# Patient Record
Sex: MALE | Race: BLACK OR AFRICAN AMERICAN | Employment: UNEMPLOYED | URBAN - METROPOLITAN AREA
[De-identification: names, ages, dates, MRNs, and addresses within clinical notes are randomized per-mention and may not be internally consistent; named-entity substitution may affect disease eponyms.]

---

## 2020-12-03 ENCOUNTER — HOSPITAL ENCOUNTER (INPATIENT)
Age: 35
LOS: 5 days | Discharge: HOME | DRG: 853 | End: 2020-12-08
Attending: EMERGENCY MEDICINE | Admitting: INTERNAL MEDICINE
Payer: OTHER GOVERNMENT

## 2020-12-03 ENCOUNTER — ANESTHESIA EVENT (OUTPATIENT)
Dept: SURGERY | Age: 35
DRG: 853 | End: 2020-12-03
Payer: OTHER GOVERNMENT

## 2020-12-03 ENCOUNTER — APPOINTMENT (OUTPATIENT)
Dept: CT IMAGING | Age: 35
DRG: 853 | End: 2020-12-03
Attending: PHYSICIAN ASSISTANT
Payer: OTHER GOVERNMENT

## 2020-12-03 ENCOUNTER — ANESTHESIA (OUTPATIENT)
Dept: SURGERY | Age: 35
DRG: 853 | End: 2020-12-03
Payer: OTHER GOVERNMENT

## 2020-12-03 DIAGNOSIS — K37 APPENDICITIS, UNSPECIFIED APPENDICITIS TYPE: Primary | ICD-10-CM

## 2020-12-03 PROBLEM — N17.9 AKI (ACUTE KIDNEY INJURY) (HCC): Status: ACTIVE | Noted: 2020-12-03

## 2020-12-03 PROBLEM — F19.10 SUBSTANCE ABUSE (HCC): Status: ACTIVE | Noted: 2020-12-03

## 2020-12-03 PROBLEM — E83.52 HYPERCALCEMIA: Status: ACTIVE | Noted: 2020-12-03

## 2020-12-03 PROBLEM — E87.6 HYPOKALEMIA: Status: ACTIVE | Noted: 2020-12-03

## 2020-12-03 PROBLEM — K35.80 ACUTE APPENDICITIS: Status: ACTIVE | Noted: 2020-12-03

## 2020-12-03 LAB
ALBUMIN SERPL-MCNC: 5 G/DL (ref 3.4–5)
ALBUMIN/GLOB SERPL: 1.2 {RATIO} (ref 0.8–1.7)
ALP SERPL-CCNC: 61 U/L (ref 45–117)
ALT SERPL-CCNC: 32 U/L (ref 16–61)
AMPHET UR QL SCN: NEGATIVE
ANION GAP SERPL CALC-SCNC: 11 MMOL/L (ref 3–18)
APPEARANCE UR: CLEAR
AST SERPL-CCNC: 25 U/L (ref 10–38)
BARBITURATES UR QL SCN: NEGATIVE
BASOPHILS # BLD: 0 K/UL (ref 0–0.1)
BASOPHILS NFR BLD: 0 % (ref 0–2)
BENZODIAZ UR QL: NEGATIVE
BILIRUB SERPL-MCNC: 0.8 MG/DL (ref 0.2–1)
BILIRUB UR QL: NEGATIVE
BUN SERPL-MCNC: 12 MG/DL (ref 7–18)
BUN/CREAT SERPL: 8 (ref 12–20)
CALCIUM SERPL-MCNC: 10.4 MG/DL (ref 8.5–10.1)
CANNABINOIDS UR QL SCN: POSITIVE
CHLORIDE SERPL-SCNC: 102 MMOL/L (ref 100–111)
CO2 SERPL-SCNC: 25 MMOL/L (ref 21–32)
COCAINE UR QL SCN: NEGATIVE
COLOR UR: YELLOW
COVID-19 RAPID TEST, COVR: NOT DETECTED
CREAT SERPL-MCNC: 1.46 MG/DL (ref 0.6–1.3)
DIFFERENTIAL METHOD BLD: ABNORMAL
EOSINOPHIL # BLD: 0 K/UL (ref 0–0.4)
EOSINOPHIL NFR BLD: 0 % (ref 0–5)
ERYTHROCYTE [DISTWIDTH] IN BLOOD BY AUTOMATED COUNT: 12.2 % (ref 11.6–14.5)
GLOBULIN SER CALC-MCNC: 4.2 G/DL (ref 2–4)
GLUCOSE SERPL-MCNC: 119 MG/DL (ref 74–99)
GLUCOSE UR STRIP.AUTO-MCNC: NEGATIVE MG/DL
HCT VFR BLD AUTO: 46.1 % (ref 36–48)
HDSCOM,HDSCOM: ABNORMAL
HEALTH STATUS, XMCV2T: NORMAL
HGB BLD-MCNC: 15.2 G/DL (ref 13–16)
HGB UR QL STRIP: NEGATIVE
KETONES UR QL STRIP.AUTO: 80 MG/DL
LACTATE BLD-SCNC: 2.61 MMOL/L (ref 0.4–2)
LEUKOCYTE ESTERASE UR QL STRIP.AUTO: NEGATIVE
LIPASE SERPL-CCNC: 75 U/L (ref 73–393)
LYMPHOCYTES # BLD: 1.5 K/UL (ref 0.9–3.6)
LYMPHOCYTES NFR BLD: 10 % (ref 21–52)
MCH RBC QN AUTO: 32.3 PG (ref 24–34)
MCHC RBC AUTO-ENTMCNC: 33 G/DL (ref 31–37)
MCV RBC AUTO: 97.9 FL (ref 74–97)
METHADONE UR QL: NEGATIVE
MONOCYTES # BLD: 0.7 K/UL (ref 0.05–1.2)
MONOCYTES NFR BLD: 5 % (ref 3–10)
NEUTS SEG # BLD: 12.5 K/UL (ref 1.8–8)
NEUTS SEG NFR BLD: 85 % (ref 40–73)
NITRITE UR QL STRIP.AUTO: NEGATIVE
OPIATES UR QL: POSITIVE
PCP UR QL: NEGATIVE
PH UR STRIP: >8.5 [PH] (ref 5–8)
PLATELET # BLD AUTO: 224 K/UL (ref 135–420)
PMV BLD AUTO: 11.8 FL (ref 9.2–11.8)
POTASSIUM SERPL-SCNC: 3.4 MMOL/L (ref 3.5–5.5)
PROT SERPL-MCNC: 9.2 G/DL (ref 6.4–8.2)
PROT UR STRIP-MCNC: NEGATIVE MG/DL
RBC # BLD AUTO: 4.71 M/UL (ref 4.7–5.5)
SODIUM SERPL-SCNC: 138 MMOL/L (ref 136–145)
SOURCE, COVRS: NORMAL
SP GR UR REFRACTOMETRY: >1.03 (ref 1–1.03)
SPECIMEN TYPE, XMCV1T: NORMAL
UROBILINOGEN UR QL STRIP.AUTO: 1 EU/DL (ref 0.2–1)
WBC # BLD AUTO: 14.8 K/UL (ref 4.6–13.2)

## 2020-12-03 PROCEDURE — 74177 CT ABD & PELVIS W/CONTRAST: CPT

## 2020-12-03 PROCEDURE — 74011250637 HC RX REV CODE- 250/637: Performed by: INTERNAL MEDICINE

## 2020-12-03 PROCEDURE — 74011000250 HC RX REV CODE- 250: Performed by: NURSE ANESTHETIST, CERTIFIED REGISTERED

## 2020-12-03 PROCEDURE — 00840 ANES IPER PX LOWER ABD NOS: CPT | Performed by: ANESTHESIOLOGY

## 2020-12-03 PROCEDURE — 74011000258 HC RX REV CODE- 258: Performed by: PHYSICIAN ASSISTANT

## 2020-12-03 PROCEDURE — 77030008518 HC TBNG INSUF ENDO STRY -B: Performed by: SURGERY

## 2020-12-03 PROCEDURE — 76010000149 HC OR TIME 1 TO 1.5 HR: Performed by: SURGERY

## 2020-12-03 PROCEDURE — 87635 SARS-COV-2 COVID-19 AMP PRB: CPT

## 2020-12-03 PROCEDURE — 00840 ANES IPER PX LOWER ABD NOS: CPT | Performed by: NURSE ANESTHETIST, CERTIFIED REGISTERED

## 2020-12-03 PROCEDURE — 77030003578 HC NDL INSUF VERES AMR -B: Performed by: SURGERY

## 2020-12-03 PROCEDURE — 74011250636 HC RX REV CODE- 250/636: Performed by: NURSE ANESTHETIST, CERTIFIED REGISTERED

## 2020-12-03 PROCEDURE — 74011250636 HC RX REV CODE- 250/636: Performed by: PHYSICIAN ASSISTANT

## 2020-12-03 PROCEDURE — 80053 COMPREHEN METABOLIC PANEL: CPT

## 2020-12-03 PROCEDURE — 74011250636 HC RX REV CODE- 250/636: Performed by: SURGERY

## 2020-12-03 PROCEDURE — 88304 TISSUE EXAM BY PATHOLOGIST: CPT

## 2020-12-03 PROCEDURE — 74011000258 HC RX REV CODE- 258: Performed by: SURGERY

## 2020-12-03 PROCEDURE — 2709999900 HC NON-CHARGEABLE SUPPLY: Performed by: SURGERY

## 2020-12-03 PROCEDURE — 99222 1ST HOSP IP/OBS MODERATE 55: CPT | Performed by: SURGERY

## 2020-12-03 PROCEDURE — 74011250636 HC RX REV CODE- 250/636: Performed by: INTERNAL MEDICINE

## 2020-12-03 PROCEDURE — 74011000250 HC RX REV CODE- 250: Performed by: INTERNAL MEDICINE

## 2020-12-03 PROCEDURE — 77030008477 HC STYL SATN SLP COVD -A: Performed by: ANESTHESIOLOGY

## 2020-12-03 PROCEDURE — 77030002933 HC SUT MCRYL J&J -A: Performed by: SURGERY

## 2020-12-03 PROCEDURE — 77030010031 HC SCIS ENDOSC MPLR J&J -C: Performed by: SURGERY

## 2020-12-03 PROCEDURE — 74011000272 HC RX REV CODE- 272: Performed by: SURGERY

## 2020-12-03 PROCEDURE — 83605 ASSAY OF LACTIC ACID: CPT

## 2020-12-03 PROCEDURE — 96375 TX/PRO/DX INJ NEW DRUG ADDON: CPT

## 2020-12-03 PROCEDURE — 83690 ASSAY OF LIPASE: CPT

## 2020-12-03 PROCEDURE — 99284 EMERGENCY DEPT VISIT MOD MDM: CPT

## 2020-12-03 PROCEDURE — 85025 COMPLETE CBC W/AUTO DIFF WBC: CPT

## 2020-12-03 PROCEDURE — 77030008606 HC TRCR ENDOSC KII AMR -B: Performed by: SURGERY

## 2020-12-03 PROCEDURE — 65270000029 HC RM PRIVATE

## 2020-12-03 PROCEDURE — 87040 BLOOD CULTURE FOR BACTERIA: CPT

## 2020-12-03 PROCEDURE — 87186 SC STD MICRODIL/AGAR DIL: CPT

## 2020-12-03 PROCEDURE — 0DTJ4ZZ RESECTION OF APPENDIX, PERCUTANEOUS ENDOSCOPIC APPROACH: ICD-10-PCS | Performed by: SURGERY

## 2020-12-03 PROCEDURE — 77030018706 HC CORD MPLR COVD -A: Performed by: SURGERY

## 2020-12-03 PROCEDURE — 76060000033 HC ANESTHESIA 1 TO 1.5 HR: Performed by: SURGERY

## 2020-12-03 PROCEDURE — 76210000016 HC OR PH I REC 1 TO 1.5 HR: Performed by: SURGERY

## 2020-12-03 PROCEDURE — 81003 URINALYSIS AUTO W/O SCOPE: CPT

## 2020-12-03 PROCEDURE — 74011000636 HC RX REV CODE- 636: Performed by: EMERGENCY MEDICINE

## 2020-12-03 PROCEDURE — 77030008683 HC TU ET CUF COVD -A: Performed by: ANESTHESIOLOGY

## 2020-12-03 PROCEDURE — 77030008771 HC TU NG SALEM SUMP -A: Performed by: ANESTHESIOLOGY

## 2020-12-03 PROCEDURE — 77030010507 HC ADH SKN DERMBND J&J -B: Performed by: SURGERY

## 2020-12-03 PROCEDURE — 96365 THER/PROPH/DIAG IV INF INIT: CPT

## 2020-12-03 PROCEDURE — 87077 CULTURE AEROBIC IDENTIFY: CPT

## 2020-12-03 PROCEDURE — 77030040361 HC SLV COMPR DVT MDII -B: Performed by: SURGERY

## 2020-12-03 PROCEDURE — 77030013079 HC BLNKT BAIR HGGR 3M -A: Performed by: ANESTHESIOLOGY

## 2020-12-03 PROCEDURE — 77030022473 HC HNDL ENDO GIA UNIV USDA -C: Performed by: SURGERY

## 2020-12-03 PROCEDURE — 80307 DRUG TEST PRSMV CHEM ANLYZR: CPT

## 2020-12-03 PROCEDURE — 77030037366 HC STPLR ENDO TRI-STPLR COVD -C: Performed by: SURGERY

## 2020-12-03 RX ORDER — ENOXAPARIN SODIUM 100 MG/ML
40 INJECTION SUBCUTANEOUS DAILY
Status: DISCONTINUED | OUTPATIENT
Start: 2020-12-04 | End: 2020-12-04

## 2020-12-03 RX ORDER — MIDAZOLAM HYDROCHLORIDE 1 MG/ML
INJECTION, SOLUTION INTRAMUSCULAR; INTRAVENOUS AS NEEDED
Status: DISCONTINUED | OUTPATIENT
Start: 2020-12-03 | End: 2020-12-03 | Stop reason: HOSPADM

## 2020-12-03 RX ORDER — SODIUM CHLORIDE 0.9 % (FLUSH) 0.9 %
5-40 SYRINGE (ML) INJECTION AS NEEDED
Status: DISCONTINUED | OUTPATIENT
Start: 2020-12-03 | End: 2020-12-08 | Stop reason: HOSPADM

## 2020-12-03 RX ORDER — ONDANSETRON 2 MG/ML
4 INJECTION INTRAMUSCULAR; INTRAVENOUS
Status: DISCONTINUED | OUTPATIENT
Start: 2020-12-03 | End: 2020-12-04 | Stop reason: HOSPADM

## 2020-12-03 RX ORDER — PROPOFOL 10 MG/ML
INJECTION, EMULSION INTRAVENOUS AS NEEDED
Status: DISCONTINUED | OUTPATIENT
Start: 2020-12-03 | End: 2020-12-03 | Stop reason: HOSPADM

## 2020-12-03 RX ORDER — PROMETHAZINE HYDROCHLORIDE 25 MG/1
12.5 TABLET ORAL
Status: DISCONTINUED | OUTPATIENT
Start: 2020-12-03 | End: 2020-12-08 | Stop reason: HOSPADM

## 2020-12-03 RX ORDER — SODIUM CHLORIDE 0.9 % (FLUSH) 0.9 %
5-40 SYRINGE (ML) INJECTION EVERY 8 HOURS
Status: DISCONTINUED | OUTPATIENT
Start: 2020-12-03 | End: 2020-12-08 | Stop reason: HOSPADM

## 2020-12-03 RX ORDER — ONDANSETRON 2 MG/ML
4 INJECTION INTRAMUSCULAR; INTRAVENOUS
Status: COMPLETED | OUTPATIENT
Start: 2020-12-03 | End: 2020-12-03

## 2020-12-03 RX ORDER — MORPHINE SULFATE 2 MG/ML
2 INJECTION, SOLUTION INTRAMUSCULAR; INTRAVENOUS
Status: COMPLETED | OUTPATIENT
Start: 2020-12-03 | End: 2020-12-03

## 2020-12-03 RX ORDER — LIDOCAINE HYDROCHLORIDE 20 MG/ML
INJECTION, SOLUTION EPIDURAL; INFILTRATION; INTRACAUDAL; PERINEURAL AS NEEDED
Status: DISCONTINUED | OUTPATIENT
Start: 2020-12-03 | End: 2020-12-03 | Stop reason: HOSPADM

## 2020-12-03 RX ORDER — NEOSTIGMINE METHYLSULFATE 1 MG/ML
INJECTION, SOLUTION INTRAVENOUS AS NEEDED
Status: DISCONTINUED | OUTPATIENT
Start: 2020-12-03 | End: 2020-12-03 | Stop reason: HOSPADM

## 2020-12-03 RX ORDER — ONDANSETRON 2 MG/ML
INJECTION INTRAMUSCULAR; INTRAVENOUS AS NEEDED
Status: DISCONTINUED | OUTPATIENT
Start: 2020-12-03 | End: 2020-12-03 | Stop reason: HOSPADM

## 2020-12-03 RX ORDER — POTASSIUM CHLORIDE 20 MEQ/1
40 TABLET, EXTENDED RELEASE ORAL
Status: COMPLETED | OUTPATIENT
Start: 2020-12-03 | End: 2020-12-03

## 2020-12-03 RX ORDER — SUCCINYLCHOLINE CHLORIDE 100 MG/5ML
SYRINGE (ML) INTRAVENOUS AS NEEDED
Status: DISCONTINUED | OUTPATIENT
Start: 2020-12-03 | End: 2020-12-03 | Stop reason: HOSPADM

## 2020-12-03 RX ORDER — ACETAMINOPHEN 650 MG/1
650 SUPPOSITORY RECTAL
Status: DISCONTINUED | OUTPATIENT
Start: 2020-12-03 | End: 2020-12-08 | Stop reason: HOSPADM

## 2020-12-03 RX ORDER — FENTANYL CITRATE 50 UG/ML
INJECTION, SOLUTION INTRAMUSCULAR; INTRAVENOUS AS NEEDED
Status: DISCONTINUED | OUTPATIENT
Start: 2020-12-03 | End: 2020-12-03 | Stop reason: HOSPADM

## 2020-12-03 RX ORDER — ONDANSETRON 2 MG/ML
4 INJECTION INTRAMUSCULAR; INTRAVENOUS
Status: DISCONTINUED | OUTPATIENT
Start: 2020-12-03 | End: 2020-12-08 | Stop reason: HOSPADM

## 2020-12-03 RX ORDER — SODIUM CHLORIDE, SODIUM LACTATE, POTASSIUM CHLORIDE, CALCIUM CHLORIDE 600; 310; 30; 20 MG/100ML; MG/100ML; MG/100ML; MG/100ML
75 INJECTION, SOLUTION INTRAVENOUS CONTINUOUS
Status: DISCONTINUED | OUTPATIENT
Start: 2020-12-03 | End: 2020-12-04

## 2020-12-03 RX ORDER — GLYCOPYRROLATE 0.2 MG/ML
INJECTION INTRAMUSCULAR; INTRAVENOUS AS NEEDED
Status: DISCONTINUED | OUTPATIENT
Start: 2020-12-03 | End: 2020-12-03 | Stop reason: HOSPADM

## 2020-12-03 RX ORDER — MORPHINE SULFATE 2 MG/ML
2 INJECTION, SOLUTION INTRAMUSCULAR; INTRAVENOUS
Status: DISCONTINUED | OUTPATIENT
Start: 2020-12-03 | End: 2020-12-03

## 2020-12-03 RX ORDER — SODIUM CHLORIDE, SODIUM LACTATE, POTASSIUM CHLORIDE, CALCIUM CHLORIDE 600; 310; 30; 20 MG/100ML; MG/100ML; MG/100ML; MG/100ML
75 INJECTION, SOLUTION INTRAVENOUS CONTINUOUS
Status: DISCONTINUED | OUTPATIENT
Start: 2020-12-03 | End: 2020-12-05

## 2020-12-03 RX ORDER — POLYETHYLENE GLYCOL 3350 17 G/17G
17 POWDER, FOR SOLUTION ORAL DAILY PRN
Status: DISCONTINUED | OUTPATIENT
Start: 2020-12-03 | End: 2020-12-08 | Stop reason: HOSPADM

## 2020-12-03 RX ORDER — HYDROMORPHONE HYDROCHLORIDE 1 MG/ML
INJECTION, SOLUTION INTRAMUSCULAR; INTRAVENOUS; SUBCUTANEOUS AS NEEDED
Status: DISCONTINUED | OUTPATIENT
Start: 2020-12-03 | End: 2020-12-03 | Stop reason: HOSPADM

## 2020-12-03 RX ORDER — ACETAMINOPHEN 325 MG/1
650 TABLET ORAL
Status: DISCONTINUED | OUTPATIENT
Start: 2020-12-03 | End: 2020-12-08 | Stop reason: HOSPADM

## 2020-12-03 RX ORDER — FENTANYL CITRATE 50 UG/ML
50 INJECTION, SOLUTION INTRAMUSCULAR; INTRAVENOUS
Status: COMPLETED | OUTPATIENT
Start: 2020-12-03 | End: 2020-12-03

## 2020-12-03 RX ORDER — VECURONIUM BROMIDE FOR INJECTION 1 MG/ML
INJECTION, POWDER, LYOPHILIZED, FOR SOLUTION INTRAVENOUS AS NEEDED
Status: DISCONTINUED | OUTPATIENT
Start: 2020-12-03 | End: 2020-12-03 | Stop reason: HOSPADM

## 2020-12-03 RX ADMIN — GLYCOPYRROLATE 0.4 MG: 0.2 INJECTION INTRAMUSCULAR; INTRAVENOUS at 20:50

## 2020-12-03 RX ADMIN — ONDANSETRON 4 MG: 2 INJECTION INTRAMUSCULAR; INTRAVENOUS at 16:36

## 2020-12-03 RX ADMIN — FAMOTIDINE 20 MG: 10 INJECTION INTRAVENOUS at 23:04

## 2020-12-03 RX ADMIN — Medication 80 MG: at 19:59

## 2020-12-03 RX ADMIN — SODIUM CHLORIDE, SODIUM LACTATE, POTASSIUM CHLORIDE, AND CALCIUM CHLORIDE 150 ML/HR: 600; 310; 30; 20 INJECTION, SOLUTION INTRAVENOUS at 19:01

## 2020-12-03 RX ADMIN — SODIUM CHLORIDE, SODIUM LACTATE, POTASSIUM CHLORIDE, AND CALCIUM CHLORIDE 150 ML/HR: 600; 310; 30; 20 INJECTION, SOLUTION INTRAVENOUS at 23:03

## 2020-12-03 RX ADMIN — IOPAMIDOL 90 ML: 612 INJECTION, SOLUTION INTRAVENOUS at 16:46

## 2020-12-03 RX ADMIN — FENTANYL CITRATE 50 MCG: 50 INJECTION, SOLUTION INTRAMUSCULAR; INTRAVENOUS at 19:54

## 2020-12-03 RX ADMIN — LIDOCAINE HYDROCHLORIDE 100 MG: 20 INJECTION, SOLUTION INTRAVENOUS at 19:59

## 2020-12-03 RX ADMIN — PROPOFOL 150 MG: 10 INJECTION, EMULSION INTRAVENOUS at 19:59

## 2020-12-03 RX ADMIN — Medication 3 MG: at 20:50

## 2020-12-03 RX ADMIN — POTASSIUM CHLORIDE 40 MEQ: 1500 TABLET, EXTENDED RELEASE ORAL at 22:14

## 2020-12-03 RX ADMIN — FENTANYL CITRATE 50 MCG: 50 INJECTION, SOLUTION INTRAMUSCULAR; INTRAVENOUS at 22:23

## 2020-12-03 RX ADMIN — MORPHINE SULFATE 2 MG: 2 INJECTION, SOLUTION INTRAMUSCULAR; INTRAVENOUS at 19:01

## 2020-12-03 RX ADMIN — PROPOFOL 50 MG: 10 INJECTION, EMULSION INTRAVENOUS at 20:50

## 2020-12-03 RX ADMIN — FENTANYL CITRATE 50 MCG: 50 INJECTION, SOLUTION INTRAMUSCULAR; INTRAVENOUS at 21:32

## 2020-12-03 RX ADMIN — HYDROMORPHONE HYDROCHLORIDE 1 MG: 1 INJECTION, SOLUTION INTRAMUSCULAR; INTRAVENOUS; SUBCUTANEOUS at 20:54

## 2020-12-03 RX ADMIN — PIPERACILLIN SODIUM AND TAZOBACTAM SODIUM 4.5 G: 4; .5 INJECTION, POWDER, LYOPHILIZED, FOR SOLUTION INTRAVENOUS at 17:20

## 2020-12-03 RX ADMIN — FENTANYL CITRATE 50 MCG: 50 INJECTION, SOLUTION INTRAMUSCULAR; INTRAVENOUS at 20:16

## 2020-12-03 RX ADMIN — ONDANSETRON 4 MG: 2 SOLUTION INTRAMUSCULAR; INTRAVENOUS at 20:48

## 2020-12-03 RX ADMIN — MORPHINE SULFATE 2 MG: 2 INJECTION, SOLUTION INTRAMUSCULAR; INTRAVENOUS at 16:38

## 2020-12-03 RX ADMIN — VECURONIUM BROMIDE FOR INJECTION 4 MG: 1 INJECTION, POWDER, LYOPHILIZED, FOR SOLUTION INTRAVENOUS at 20:03

## 2020-12-03 RX ADMIN — MIDAZOLAM 2 MG: 1 INJECTION INTRAMUSCULAR; INTRAVENOUS at 19:54

## 2020-12-03 RX ADMIN — PIPERACILLIN AND TAZOBACTAM 3.38 G: 3; .375 INJECTION, POWDER, LYOPHILIZED, FOR SOLUTION INTRAVENOUS at 23:09

## 2020-12-03 NOTE — PROGRESS NOTES
Dosing for Zosyn requested by Dr Sravanthi Brewer. Patient in ER with likely OR this evening for acute appendicitis. If continued postop change to extended dosing interval.  Sabrina Griffin. Jacquelyn MS R. Ph  [phone]

## 2020-12-03 NOTE — ED PROVIDER NOTES
70-year-old male with noted past medical history here with complaint of abdominal pain started this morning. States he just has associated nausea, vomited once prior to arrival.  Normal bowel movements. No fever chills, shortness of breath or chest pain. At this time patient states his pain is a 10 out of 10, and lower abdomen and cannot specify exact location. States it feels dull. No flank pain or urinary symptoms. No penile discharge or other  symptoms           No past medical history on file. No past surgical history on file. No family history on file.     Social History     Socioeconomic History    Marital status: SINGLE     Spouse name: Not on file    Number of children: Not on file    Years of education: Not on file    Highest education level: Not on file   Occupational History    Not on file   Social Needs    Financial resource strain: Not on file    Food insecurity     Worry: Not on file     Inability: Not on file    Transportation needs     Medical: Not on file     Non-medical: Not on file   Tobacco Use    Smoking status: Not on file   Substance and Sexual Activity    Alcohol use: Not on file    Drug use: Not on file    Sexual activity: Not on file   Lifestyle    Physical activity     Days per week: Not on file     Minutes per session: Not on file    Stress: Not on file   Relationships    Social connections     Talks on phone: Not on file     Gets together: Not on file     Attends Yarsanism service: Not on file     Active member of club or organization: Not on file     Attends meetings of clubs or organizations: Not on file     Relationship status: Not on file    Intimate partner violence     Fear of current or ex partner: Not on file     Emotionally abused: Not on file     Physically abused: Not on file     Forced sexual activity: Not on file   Other Topics Concern    Not on file   Social History Narrative    Not on file         ALLERGIES: Patient has no allergy information on record. Review of Systems   Constitutional: Negative for chills and fever. HENT: Negative for congestion. Respiratory: Negative for cough and wheezing. Cardiovascular: Negative for chest pain and leg swelling. Gastrointestinal: Positive for abdominal pain. Negative for abdominal distention, anal bleeding, blood in stool, constipation, diarrhea, nausea and vomiting. Genitourinary: Negative. Musculoskeletal: Negative. Skin: Negative. Neurological: Negative for dizziness, seizures, weakness and headaches. Hematological: Negative. Psychiatric/Behavioral: Negative. All other systems reviewed and are negative. Vitals:    12/03/20 1513   BP: 128/86   Pulse: (!) 54   Resp: 20   Temp: 97.7 °F (36.5 °C)   SpO2: 100%   Weight: 78 kg (172 lb)   Height: 6' 4\" (1.93 m)            Physical Exam  Vitals signs and nursing note reviewed. Constitutional:       General: He is not in acute distress. Appearance: He is well-developed. He is not diaphoretic. Comments: NAD, well hydrated, non toxic     HENT:      Head: Normocephalic and atraumatic. Nose: Nose normal.      Mouth/Throat:      Pharynx: No oropharyngeal exudate. Eyes:      Conjunctiva/sclera: Conjunctivae normal.      Pupils: Pupils are equal, round, and reactive to light. Neck:      Musculoskeletal: Normal range of motion and neck supple. Cardiovascular:      Rate and Rhythm: Normal rate and regular rhythm. Heart sounds: Normal heart sounds. No murmur. Pulmonary:      Effort: Pulmonary effort is normal. No respiratory distress. Breath sounds: Normal breath sounds. No wheezing or rales. Abdominal:      General: There is no distension. Palpations: Abdomen is soft. Tenderness: There is abdominal tenderness in the right lower quadrant and epigastric area. There is no right CVA tenderness, left CVA tenderness, guarding or rebound.  Negative signs include Rousseau's sign, Rovsing's sign, McBurney's sign, psoas sign and obturator sign. Musculoskeletal: Normal range of motion. Lymphadenopathy:      Cervical: No cervical adenopathy. Skin:     General: Skin is warm. Findings: No rash. Neurological:      Mental Status: He is alert and oriented to person, place, and time. Cranial Nerves: No cranial nerve deficit. Coordination: Coordination normal.   Psychiatric:         Behavior: Behavior normal.          MDM       Procedures    Medications ordered:   Medications   ondansetron (ZOFRAN) injection 4 mg (has no administration in time range)         Lab findings:  Recent Results (from the past 12 hour(s))   CBC WITH AUTOMATED DIFF    Collection Time: 12/03/20  3:28 PM   Result Value Ref Range    WBC 14.8 (H) 4.6 - 13.2 K/uL    RBC 4.71 4.70 - 5.50 M/uL    HGB 15.2 13.0 - 16.0 g/dL    HCT 46.1 36.0 - 48.0 %    MCV 97.9 (H) 74.0 - 97.0 FL    MCH 32.3 24.0 - 34.0 PG    MCHC 33.0 31.0 - 37.0 g/dL    RDW 12.2 11.6 - 14.5 %    PLATELET 921 111 - 733 K/uL    MPV 11.8 9.2 - 11.8 FL    NEUTROPHILS 85 (H) 40 - 73 %    LYMPHOCYTES 10 (L) 21 - 52 %    MONOCYTES 5 3 - 10 %    EOSINOPHILS 0 0 - 5 %    BASOPHILS 0 0 - 2 %    ABS. NEUTROPHILS 12.5 (H) 1.8 - 8.0 K/UL    ABS. LYMPHOCYTES 1.5 0.9 - 3.6 K/UL    ABS. MONOCYTES 0.7 0.05 - 1.2 K/UL    ABS. EOSINOPHILS 0.0 0.0 - 0.4 K/UL    ABS.  BASOPHILS 0.0 0.0 - 0.1 K/UL    DF AUTOMATED     METABOLIC PANEL, COMPREHENSIVE    Collection Time: 12/03/20  3:28 PM   Result Value Ref Range    Sodium 138 136 - 145 mmol/L    Potassium 3.4 (L) 3.5 - 5.5 mmol/L    Chloride 102 100 - 111 mmol/L    CO2 25 21 - 32 mmol/L    Anion gap 11 3.0 - 18 mmol/L    Glucose 119 (H) 74 - 99 mg/dL    BUN 12 7.0 - 18 MG/DL    Creatinine 1.46 (H) 0.6 - 1.3 MG/DL    BUN/Creatinine ratio 8 (L) 12 - 20      GFR est AA >60 >60 ml/min/1.73m2    GFR est non-AA 55 (L) >60 ml/min/1.73m2    Calcium 10.4 (H) 8.5 - 10.1 MG/DL    Bilirubin, total 0.8 0.2 - 1.0 MG/DL    ALT (SGPT) 32 16 - 61 U/L AST (SGOT) 25 10 - 38 U/L    Alk. phosphatase 61 45 - 117 U/L    Protein, total 9.2 (H) 6.4 - 8.2 g/dL    Albumin 5.0 3.4 - 5.0 g/dL    Globulin 4.2 (H) 2.0 - 4.0 g/dL    A-G Ratio 1.2 0.8 - 1.7     LIPASE    Collection Time: 12/03/20  3:28 PM   Result Value Ref Range    Lipase 75 73 - 393 U/L          X-Ray, CT or other radiology findings or impressions:  No results found. Progress notes, Consult notes or additional Procedure notes:     70-year-old male here with abdominal pain that started this morning associated with some vomiting. Patient is noted to have elevated white blood count, no bands. No other abnormal findings on labs. CAT scan consistent with acute appendicitis. Patient will be admitted to Dr. Maureen Gibson after discussion with Dr. Lisa Bates who will plan to take patient to surgery tonight or tomorrow morning. He agrees with Claudian and is asking for patient to have a Covid test and for him to be n.p.o. Patient is agreeable this time for admission    Dispo:   Admitted      Diagnosis: Acute appendicitis    Follow-up Information    None          Patient's Medications    No medications on file

## 2020-12-03 NOTE — ED TRIAGE NOTES
Pt arrives with c/o abdominal pain that he woke up with this am. Pt states emesis a few times and chills also. Pt denies dysuria or vomiting.

## 2020-12-03 NOTE — H&P
Internal Medicine History and Physical  Note           NAME:  Mitch Majano   :   1985   MRN:  [id number]     PCP:  None     Date/Time:  12/3/2020 6:21 PM      I hereby certify this patient for admission based upon medical necessity as noted below:        Assessment / plan :        #   Acute appendicitis (12/3/2020). Dw surgeon on call , he will take to to surgery likely tonight , keep npo. IVF  Iv zosyn. #   Substance abuse ([facility] [address] (12/3/2020). Urine drug. Counseled on quit substances. Alcohol and smoking, weeds. #  SCOT (acute kidney injury) ([facility] [address] (12/3/2020). IVF. Serial labs. Avoid nephro toxins      #  Hypercalcemia (12/3/2020). IVF. Monitor Ca level      #  Hypokalemia (12/3/2020). Replete. trend mg level       # Continue home regimen / Medications when appropriate. -DVT prophylaxis :  lovenox. - Code Status : FULL    -Other chronic medical problems as per past history. Further management depend on the course of the case and expanded data base. DISPO - pt to be admitted at this time for reasons addressed above, continued hospitalization for ongoing assessment and treatment indicated        Subjective:     CHIEF COMPLAINT: Sever abdominal pains started today      HISTORY OF PRESENT ILLNESS:     Mr. Haris Gilliam is a 28 y.o.  male who is admitted for acute appendicitis . Mr. Haris Gilliam with past medical history as noted below , presented to the Emergency Department today  complaining of above, co sever lower abdominal pains mostly on R side with tenderness. Report fever and chills earlier today and now feel cold, getting better on pain meds given in er. Sever pains started this am. CT scan abd reported picture of appendicitis. Started on iv zosyn in er. Triage and ER notes noted. ER MD wanted to admit the patient to the hospital for further management and called hospitalist to facilitate this process.  Surgery dw ER provider and requested npo now and iv Abx. Deny been on regular home medications. He admit drinks 4 drinks of Koniac a day , smoke tobacco and weeds. No exposure to COVID. Blood culture sent from ER. WBC elevated . No past medical history on file. No past surgical history on file. Social History     Tobacco Use    Smoking status: Not on file   Substance Use Topics    Alcohol use: Not on file        No family history on file.      No Known Allergies     Prior to Admission medications    Not on File       Review of Systems:  (bold if positive, otherwise negative), apart from what mentioned in HPI  Apart from above patient deny any other significant complain  Gen:  Weight gain, weight loss, fever, chills, fatigue  Eyes:  Visual changes, pain, conjunctivitis  ENT:  Sore throat, rhinorrhea, decreased hearing  CVS:  Palpitations, chest pain, dizziness, syncope, edema, PND  Pulm:  Cough, dyspnea, sputum, hemoptysis, wheezing  GI:  Abdominal pain, nausea, emesis, diarrhea, constipation, GERD, melena  :  Hematuria, incontinence, nocturia, dysuria, discharge  MS:  Pain, weakness, swelling, arthritis  Skin:  Rash, erythema, abscess, wound, moles  Endo:  Heat intolerance, cold intolerance, weight gain, polyuria  Hem:  Enlarged nodes, bruising, bleeding, night sweats  Renal:  Edema, change in urine, flank pain  Neuro:  Numbness, tingling, weakness, seizure, headache, tremors       VITALS:    Vital signs reviewed; most recent are:    Visit Vitals  /86 (BP 1 Location: Right arm, BP Patient Position: Sitting)   Pulse (!) 54   Temp 97.7 °F (36.5 °C)   Resp 20   Ht 6' 4\" (1.93 m)   Wt 78 kg (172 lb)   SpO2 100%   BMI 20.94 kg/m²     SpO2 Readings from Last 6 Encounters:   12/03/20 100%        No intake or output data in the 24 hours ending 12/03/20 4431     Physical Exam:     Gen:  Appear stated age, Well-developed,   in   acute distress  HEENT:  Head atraumatic, normocephalic , hearing intact to voice    Neck:  Trachea midline , No apparent JVD, Supple,  thyroid non-tender  Resp:  No accessory muscle use,Bilateral BS present, clear breath sounds without wheezes rales or rhonchi  Card:  normal S1, S2 without Gallop . No Significant lower leg peripheral edema. Abd: interestingly I couldn't elicit tenderness ,   Soft, non-tender, non-distended, bowel sounds are present . Musc:  No cyanosis or clubbing. Skin: Warm and dry . No rashes or ulcers, skin turgor is good. Neuro:  Cranial nerves are grossly intact, no clear area of focal motor weakness, follows commands appropriately. Good insight,  alert. Labs: All Cardiac Markers in the last 24 hours: No results found for: CPK, CK, CKMMB, CKMB, RCK3, CKMBT, CKNDX, CKND1, BRIGIDA, TROPT, TROIQ, JUSTINE, TROPT, TNIPOC, BNP, BNPP    Recent Labs     12/03/20  1528   WBC 14.8*   HGB 15.2   HCT 46.1        Recent Labs     12/03/20  1528      K 3.4*      CO2 25   *   BUN 12   CREA 1.46*   CA 10.4*   ALB 5.0   TBILI 0.8   ALT 32     No results found for: GLUCPOC  No results for input(s): PH, PCO2, PO2, HCO3, FIO2 in the last 72 hours. No results for input(s): INR, INREXT in the last 72 hours. Ct Abd Pelv W Cont    Result Date: 12/3/2020  IMPRESSION: 1.  CT findings compatible with early acute unruptured appendicitis. No evidence of radiopaque appendicoliths. No evidence of an organized or drainable periappendiceal fluid collection. Please refer to radiology reports. Risk of deterioration: high      Total time spent in the care of this patient: [address] discussed with: Patient, Nursing Staff, Consultant/Specialist, >50% of time spent in counseling and coordination of care and ER MD      Discussed:  Care Plan       I have personally reviewed all pertinent labs, films and EKGs that have officially resulted.  I reviewed available pertaining electronic documentation outlining the initial presentation as well as the emergency room physician's encounter. This document in whole or part of it has been produced using voice recognition software. Unrecognized errors in transcription may be present.     Attending Physician: Richy Parekh MD

## 2020-12-04 LAB
ALBUMIN SERPL-MCNC: 4.2 G/DL (ref 3.4–5)
ALBUMIN/GLOB SERPL: 1.1 {RATIO} (ref 0.8–1.7)
ALP SERPL-CCNC: 50 U/L (ref 45–117)
ALT SERPL-CCNC: 27 U/L (ref 16–61)
ANION GAP SERPL CALC-SCNC: 7 MMOL/L (ref 3–18)
AST SERPL-CCNC: 18 U/L (ref 10–38)
BASOPHILS # BLD: 0 K/UL (ref 0–0.1)
BASOPHILS NFR BLD: 0 % (ref 0–2)
BILIRUB DIRECT SERPL-MCNC: 0.2 MG/DL (ref 0–0.2)
BILIRUB SERPL-MCNC: 0.7 MG/DL (ref 0.2–1)
BUN SERPL-MCNC: 10 MG/DL (ref 7–18)
BUN/CREAT SERPL: 8 (ref 12–20)
CALCIUM SERPL-MCNC: 9.7 MG/DL (ref 8.5–10.1)
CHLORIDE SERPL-SCNC: 105 MMOL/L (ref 100–111)
CO2 SERPL-SCNC: 27 MMOL/L (ref 21–32)
CREAT SERPL-MCNC: 1.32 MG/DL (ref 0.6–1.3)
DIFFERENTIAL METHOD BLD: ABNORMAL
EOSINOPHIL # BLD: 0 K/UL (ref 0–0.4)
EOSINOPHIL NFR BLD: 0 % (ref 0–5)
ERYTHROCYTE [DISTWIDTH] IN BLOOD BY AUTOMATED COUNT: 12.5 % (ref 11.6–14.5)
GLOBULIN SER CALC-MCNC: 3.8 G/DL (ref 2–4)
GLUCOSE SERPL-MCNC: 112 MG/DL (ref 74–99)
HCT VFR BLD AUTO: 48.6 % (ref 36–48)
HGB BLD-MCNC: 15.8 G/DL (ref 13–16)
LACTATE SERPL-SCNC: 1.3 MMOL/L (ref 0.4–2)
LIPASE SERPL-CCNC: 167 U/L (ref 73–393)
LYMPHOCYTES # BLD: 0.4 K/UL (ref 0.9–3.6)
LYMPHOCYTES NFR BLD: 6 % (ref 21–52)
MAGNESIUM SERPL-MCNC: 1.7 MG/DL (ref 1.6–2.6)
MCH RBC QN AUTO: 32.2 PG (ref 24–34)
MCHC RBC AUTO-ENTMCNC: 32.5 G/DL (ref 31–37)
MCV RBC AUTO: 99 FL (ref 74–97)
MONOCYTES # BLD: 0.4 K/UL (ref 0.05–1.2)
MONOCYTES NFR BLD: 6 % (ref 3–10)
NEUTS SEG # BLD: 6 K/UL (ref 1.8–8)
NEUTS SEG NFR BLD: 88 % (ref 40–73)
PHOSPHATE SERPL-MCNC: 4.5 MG/DL (ref 2.5–4.9)
PLATELET # BLD AUTO: 200 K/UL (ref 135–420)
PMV BLD AUTO: 11.4 FL (ref 9.2–11.8)
POTASSIUM SERPL-SCNC: 4.4 MMOL/L (ref 3.5–5.5)
PROT SERPL-MCNC: 8 G/DL (ref 6.4–8.2)
RBC # BLD AUTO: 4.91 M/UL (ref 4.7–5.5)
SODIUM SERPL-SCNC: 139 MMOL/L (ref 136–145)
WBC # BLD AUTO: 6.8 K/UL (ref 4.6–13.2)

## 2020-12-04 PROCEDURE — 80076 HEPATIC FUNCTION PANEL: CPT

## 2020-12-04 PROCEDURE — 74011000258 HC RX REV CODE- 258: Performed by: SURGERY

## 2020-12-04 PROCEDURE — 83690 ASSAY OF LIPASE: CPT

## 2020-12-04 PROCEDURE — 94760 N-INVAS EAR/PLS OXIMETRY 1: CPT

## 2020-12-04 PROCEDURE — 44970 LAPAROSCOPY APPENDECTOMY: CPT | Performed by: SURGERY

## 2020-12-04 PROCEDURE — 36415 COLL VENOUS BLD VENIPUNCTURE: CPT

## 2020-12-04 PROCEDURE — 80048 BASIC METABOLIC PNL TOTAL CA: CPT

## 2020-12-04 PROCEDURE — 74011000258 HC RX REV CODE- 258: Performed by: HOSPITALIST

## 2020-12-04 PROCEDURE — 83735 ASSAY OF MAGNESIUM: CPT

## 2020-12-04 PROCEDURE — 74011250636 HC RX REV CODE- 250/636: Performed by: SURGERY

## 2020-12-04 PROCEDURE — 94660 CPAP INITIATION&MGMT: CPT

## 2020-12-04 PROCEDURE — 74011250636 HC RX REV CODE- 250/636: Performed by: HOSPITALIST

## 2020-12-04 PROCEDURE — 85025 COMPLETE CBC W/AUTO DIFF WBC: CPT

## 2020-12-04 PROCEDURE — 84100 ASSAY OF PHOSPHORUS: CPT

## 2020-12-04 PROCEDURE — 99024 POSTOP FOLLOW-UP VISIT: CPT | Performed by: SURGERY

## 2020-12-04 PROCEDURE — 2709999900 HC NON-CHARGEABLE SUPPLY

## 2020-12-04 PROCEDURE — 77030035694 HC MSK BIPAP FLL FAC PERFMAX PHIL -B

## 2020-12-04 PROCEDURE — 74011000250 HC RX REV CODE- 250: Performed by: INTERNAL MEDICINE

## 2020-12-04 PROCEDURE — 83605 ASSAY OF LACTIC ACID: CPT

## 2020-12-04 PROCEDURE — 65270000029 HC RM PRIVATE

## 2020-12-04 PROCEDURE — 74011250637 HC RX REV CODE- 250/637: Performed by: HOSPITALIST

## 2020-12-04 PROCEDURE — 74011250636 HC RX REV CODE- 250/636: Performed by: INTERNAL MEDICINE

## 2020-12-04 RX ORDER — OXYCODONE AND ACETAMINOPHEN 5; 325 MG/1; MG/1
1 TABLET ORAL
Status: DISCONTINUED | OUTPATIENT
Start: 2020-12-04 | End: 2020-12-08 | Stop reason: HOSPADM

## 2020-12-04 RX ADMIN — PIPERACILLIN AND TAZOBACTAM 3.38 G: 3; .375 INJECTION, POWDER, LYOPHILIZED, FOR SOLUTION INTRAVENOUS at 23:44

## 2020-12-04 RX ADMIN — OXYCODONE HYDROCHLORIDE AND ACETAMINOPHEN 1 TABLET: 5; 325 TABLET ORAL at 20:19

## 2020-12-04 RX ADMIN — PIPERACILLIN AND TAZOBACTAM 3.38 G: 3; .375 INJECTION, POWDER, LYOPHILIZED, FOR SOLUTION INTRAVENOUS at 15:25

## 2020-12-04 RX ADMIN — OXYCODONE HYDROCHLORIDE AND ACETAMINOPHEN 1 TABLET: 5; 325 TABLET ORAL at 03:35

## 2020-12-04 RX ADMIN — OXYCODONE HYDROCHLORIDE AND ACETAMINOPHEN 1 TABLET: 5; 325 TABLET ORAL at 15:23

## 2020-12-04 RX ADMIN — OXYCODONE HYDROCHLORIDE AND ACETAMINOPHEN 1 TABLET: 5; 325 TABLET ORAL at 10:24

## 2020-12-04 RX ADMIN — PIPERACILLIN AND TAZOBACTAM 3.38 G: 3; .375 INJECTION, POWDER, LYOPHILIZED, FOR SOLUTION INTRAVENOUS at 10:24

## 2020-12-04 RX ADMIN — Medication 10 ML: at 22:00

## 2020-12-04 RX ADMIN — PIPERACILLIN AND TAZOBACTAM 3.38 G: 3; .375 INJECTION, POWDER, LYOPHILIZED, FOR SOLUTION INTRAVENOUS at 04:52

## 2020-12-04 RX ADMIN — FAMOTIDINE 20 MG: 10 INJECTION INTRAVENOUS at 20:20

## 2020-12-04 RX ADMIN — FAMOTIDINE 20 MG: 10 INJECTION INTRAVENOUS at 10:18

## 2020-12-04 NOTE — PERIOP NOTES
Assumed care of pt. Report from Liliana Victoria RN. Denise Marrero. PIV without complications. No c/o pain/discomfort. TRANSFER - OUT REPORT:    Verbal report given to Nagi Duarte  (name) on Joseph Oneill  being transferred to     (unit) for routine post - op       Report consisted of patients Situation, Background, Assessment and   Recommendations(SBAR). Information from the following report(s) OR Summary, Procedure Summary, Intake/Output and MAR was reviewed with the receiving nurse. Lines:   Peripheral IV 33/20/77 Left Cephalic (Active)   Site Assessment Clean, dry, & intact 12/04/20 0615   Phlebitis Assessment 0 12/04/20 0615   Infiltration Assessment 0 12/04/20 0615   Dressing Status Clean, dry, & intact 12/04/20 0615   Dressing Type Transparent;Tape 12/04/20 0615   Hub Color/Line Status Pink; Infusing 12/04/20 0615        Opportunity for questions and clarification was provided.       Patient transported with:   [employer]

## 2020-12-04 NOTE — ANESTHESIA PREPROCEDURE EVALUATION
Relevant Problems   No relevant active problems       Anesthetic History   No history of anesthetic complications            Review of Systems / Medical History  Patient summary reviewed and pertinent labs reviewed    Pulmonary  Within defined limits                 Neuro/Psych   Within defined limits           Cardiovascular  Within defined limits                     GI/Hepatic/Renal  Within defined limits              Endo/Other  Within defined limits           Other Findings   Comments: Substance abuse           Physical Exam    Airway  Mallampati: I  TM Distance: > 6 cm  Neck ROM: normal range of motion        Cardiovascular  Regular rate and rhythm,  S1 and S2 normal,  no murmur, click, rub, or gallop             Dental  No notable dental hx       Pulmonary  Breath sounds clear to auscultation               Abdominal  GI exam deferred       Other Findings            Anesthetic Plan    ASA: 2, emergent  Anesthesia type: general          Induction: Intravenous  Anesthetic plan and risks discussed with: Patient

## 2020-12-04 NOTE — ED NOTES
Consent form signed and witnessed by myself, patient, and surgeon Dr. Elma Weir.  To be scanned into chart by

## 2020-12-04 NOTE — OP NOTES
[facility]  OPERATIVE REPORT    Name:  Joshua Molina  MR#:   [id number]  :  1985  ACCOUNT #:  [de-identified]  DATE OF SERVICE:  2020      PREOPERATIVE DIAGNOSIS:  Acute appendicitis. POSTOPERATIVE DIAGNOSIS:  Acute ruptured appendicitis. PROCEDURE PERFORMED:  Laparoscopic appendectomy. SURGEON:  Tasia Wright. MD Cyn Thompson. ANESTHESIA:  General.    COMPLICATIONS:  None. SPECIMENS REMOVED:  Appendix. IMPLANTS:  None. ESTIMATED BLOOD LOSS:  Minimal.    PROCEDURE:  The patient was brought to operating room. Anesthesia was induced. Scrubbing and draping of the abdomen was done in the usual manner. A time-out was performed. A skin incision was performed in the supraumbilical area. Veress needle was inserted. Saline drop test was performed. Abdomen was insufflated. A 5 mm port was inserted. Exploration of the abdomen revealed no injury from the Veress needle or the port and also at that point, there was no evidence of any rupture of the appendix because there was no fluid seen, but we could not see the appendix because there was omentum on the cecum. So, at this point, under direct visualization, another 5 mm port was placed in the supraumbilical area and a 12 mm port was placed in the left lower quadrant. At this point, a grasper and the [place] were used and we placed the patient in Trendelenburg position with the right side slightly up and then I was able to move the omentum gently and I was able to identify the cecum but at this point, I realized that there is a very large thick, dilated appendix that was stuck to the small bowel at the distal ileum. I was not sure if this represents a tumor or severe appendicitis with contained ruptured ceiling under small bowel.   So, at this point, I again identified the cecum and I tried to identify the base of the appendix, but I was not able to see it because of the adhesion between the middle part of the appendix where the inflammation was and distal ileum surprisingly were stuck to each other. So, at this point, I grabbed the mesoappendix and I lifted up and I tried to gently dissect the mesentery of the mesoappendix, but then I reached an area where the small bowel, the distal ileum was stuck to the appendix itself. I tried to dissect this gently, but however, the area was very hard to dissect and very thick. So, I decided to stop at this point and dissect at the base between the appendix and the cecum. I was able to find a part of the appendix proximal towards the inflammation, but that looked normal.  I was able to dissect behind it and create a space and this completely looked normal, so I was happy that I can fire a stapler there but at that point, I waited to make sure that I will be able to dissect the small bowel from the appendix and make sure there is no perforation. When I started to dissect the appendix from the small bowel, there was a gush of contrast from a perforated appendix, so at this point, I used suction and irrigation and I suctioned all the contrast.  At this point, I had to look at the small bowel area to make sure there is no fistula or erosion from the appendix into the small bowel and that this is only an inflammatory reaction. However, when I lifted the appendix, it looked very hard and bulgy like a tumor, but later on when we realized that it was ruptured, it did decrease in the thickness, so it made me think that this was probably a collection from the rupture but not a tumor. So, at this point, I fired a blue load ROBBIN at the base of the appendix and the mesoappendix was already dissected from before during the resection to split the appendix from the small bowel. So, at this point, I placed the appendix aside and I did multiple irrigation and suctioning, small amount each time to be able to aspirate as much as possible of the fluid that I used to irrigate the area. At the end, the area was very clean and at least at the base of the cecum, the stapler line was good with no evidence of any ischemia and the mesoappendix was clean and dry with no evidence of any bleeding and then specifically I looked at the distal ileum at the site where it was stuck to the appendix to see if there was any serosal tear or any perforation or any fistula and there was none. So, at this point, I placed an EndoCatch through a 12 mm port and I put the appendix in the EndoCatch and we took it out through a 12 mm incision. I tried to feel the appendix outside through the bag, but I was  not able to feel any mass, which was kind of reassuring, but we will send the appendix for pathology. At this point, the fascia of the 12 mm port was closed with a #0 Vicryl suture and exploration of the abdomen revealed no bowel entrapment and good hemostasis and at this point, all the skin incisions were closed with 4-0 Monocryl and glue.       Tc Knutson MD      [id number]  D:  12/03/2020 21:01  T:  12/04/2020 4:03  JOB #:  [id number]

## 2020-12-04 NOTE — PROGRESS NOTES
Patient resting in bed with eyes closed, respirations are even and non-labored. No distress observed.

## 2020-12-04 NOTE — DISCHARGE INSTRUCTIONS
Discharge Instructions Following Surgery    Patient: Mitch Majano MRN: [id number]  SSN: [id number]    YOB: 1985  Age: 28 y.o. Sex: male      Activity  · As tolerated, walking encourage, stairs are okay. · Avoid strenuous activities - no lifting anything heavier than 15 pounds till seen in the clinic. · You may shower at home. Diet  · Regular diet. Pain  · Take pain medication as directed by your doctor. · Call your doctor if pain is NOT relieved by medication. Wound and Dressing Care  · There is glue on the wounds. No need for any dressing care. · Apply ice packs to the area of the surgery for the first 1 to 2 days  · Apply warm compresses after 2 days for pain relieve if needed    Call your doctor if  · Excessive bleeding that does not stop after holding mild pressure over the area. · Temperature of 101 degrees F or above. · Redness,excessive swelling or bruising, and/or green or yellow, smelly discharge from incision. · If nausea and vomiting continues. Appointment date/time Follow-Up Phone Calls    · Call the office at [phone] to make your follow-up appointment in 2 weeks after the surgery (if not already set up) . Dr. Ye Major cell phone number is [phone]. Please call me if you have any concerns or questions.

## 2020-12-04 NOTE — BRIEF OP NOTE
Brief Postoperative Note    Patient: Rohan Lim  YOB: 1985  MRN: [id number]    Date of Procedure: 12/3/2020     Pre-Op Diagnosis: Acute appendicitis, unspecified acute appendicitis type [K35.80]    Post-Op Diagnosis: Acute ruptured appendicitis. Procedure(s):  APPENDECTOMY LAPAROSCOPIC    Surgeon(s):  Hai Duong MD    Surgical Assistant: None    Anesthesia: General     Estimated Blood Loss (mL): Minimal    Complications: None    Specimens:   ID Type Source Tests Collected by Time Destination   1 : Appendix Preservative Appendix  Hai Duong MD 12/3/2020 2044 Pathology        Implants: * No implants in log *    Drains: * No LDAs found *    Findings: Acute ruptured appendicitis.      Electronically Signed by Leonarda Betts MD on 12/3/2020 at 8:48 PM

## 2020-12-04 NOTE — CONSULTS
General Surgery Consult    Herber Dean  Admit date: 12/3/2020    MRN: [id number]     : 1985     Age: 28 y.o. Attending Physician: Sana Juarez MD, [facility]      History of Present Illness:      Herber Dean is a 28 y.o. male who presented with abdominal pain of 1 day duration associated with nausea and vomiting. The patient had severe pain when he presented but currently he is not having any pain. In the emergency room he had leukocytosis. A CT scan of abdomen and pelvis was performed that showed acute appendicitis. He is already being admitted to the medicine service and he was already started on IV fluid and IV antibiotics. Patient Active Problem List    Diagnosis Date Noted    Acute appendicitis 2020    Substance abuse ([facility] 2020    SCOT (acute kidney injury) ([facility] 2020    Hypercalcemia 2020    Hypokalemia 2020     History reviewed. No pertinent past medical history. History reviewed. No pertinent surgical history. Social History     Tobacco Use    Smoking status: Not on file   Substance Use Topics    Alcohol use: Not on file      Social History     Tobacco Use   Smoking Status Not on file     History reviewed. No pertinent family history. Current Facility-Administered Medications   Medication Dose Route Frequency    potassium chloride (K-DUR, KLOR-CON) SR tablet 40 mEq  40 mEq Oral NOW    lactated Ringers infusion  150 mL/hr IntraVENous CONTINUOUS    piperacillin-tazobactam (ZOSYN) 3.375 g in 0.9% sodium chloride (MBP/ADV) 100 mL MBP  3.375 g IntraVENous Q6H     No current outpatient medications on file.       No Known Allergies     Review of Systems:  Constitutional: negative  Eyes: negative  Ears, Nose, Mouth, Throat, and Face: negative  Respiratory: negative  Cardiovascular: negative  Gastrointestinal: positive for nausea, vomiting and abdominal pain  Genitourinary:negative  Integument/Breast: negative  Hematologic/Lymphatic: negative  Musculoskeletal:negative  Neurological: negative  Behavioral/Psychiatric: negative  Endocrine: negative  Allergic/Immunologic: negative    Objective:     Visit Vitals  /81   Pulse 60   Temp 97.7 °F (36.5 °C)   Resp 18   Ht 6' 4\" (1.93 m)   Wt 78 kg (172 lb)   SpO2 99%   BMI 20.94 kg/m²       Physical Exam:      General:  in no apparent distress, alert, oriented times 3 and cooperative   Eyes:  conjunctivae and sclerae normal, pupils equal, round, reactive to light   Throat & Neck: no erythema or exudates noted and neck supple and symmetrical; no palpable masses   Lungs:   clear to auscultation bilaterally   Heart:  Regular rate and rhythm   Abdomen:   flat, soft, nontender, nondistended, no masses or organomegaly. No abdominal wall hernias. Extremities: extremities normal, atraumatic, no cyanosis or edema   Skin: Normal.       Imaging and Lab Review:     CBC:   Lab Results   Component Value Date/Time    WBC 14.8 (H) 12/03/2020 03:28 PM    RBC 4.71 12/03/2020 03:28 PM    HGB 15.2 12/03/2020 03:28 PM    HCT 46.1 12/03/2020 03:28 PM    PLATELET 323 48/39/3117 03:28 PM     BMP:   Lab Results   Component Value Date/Time    Glucose 119 (H) 12/03/2020 03:28 PM    Sodium 138 12/03/2020 03:28 PM    Potassium 3.4 (L) 12/03/2020 03:28 PM    Chloride 102 12/03/2020 03:28 PM    CO2 25 12/03/2020 03:28 PM    BUN 12 12/03/2020 03:28 PM    Creatinine 1.46 (H) 12/03/2020 03:28 PM    Calcium 10.4 (H) 12/03/2020 03:28 PM     CMP:  Lab Results   Component Value Date/Time    Glucose 119 (H) 12/03/2020 03:28 PM    Sodium 138 12/03/2020 03:28 PM    Potassium 3.4 (L) 12/03/2020 03:28 PM    Chloride 102 12/03/2020 03:28 PM    CO2 25 12/03/2020 03:28 PM    BUN 12 12/03/2020 03:28 PM    Creatinine 1.46 (H) 12/03/2020 03:28 PM    Calcium 10.4 (H) 12/03/2020 03:28 PM    Anion gap 11 12/03/2020 03:28 PM    BUN/Creatinine ratio 8 (L) 12/03/2020 03:28 PM    Alk.  phosphatase 61 12/03/2020 03:28 PM    Protein, total 9.2 (H) 12/03/2020 03:28 PM    Albumin 5.0 12/03/2020 03:28 PM    Globulin 4.2 (H) 12/03/2020 03:28 PM    A-G Ratio 1.2 12/03/2020 03:28 PM       Recent Results (from the past 24 hour(s))   CBC WITH AUTOMATED DIFF    Collection Time: 12/03/20  3:28 PM   Result Value Ref Range    WBC 14.8 (H) 4.6 - 13.2 K/uL    RBC 4.71 4.70 - 5.50 M/uL    HGB 15.2 13.0 - 16.0 g/dL    HCT 46.1 36.0 - 48.0 %    MCV 97.9 (H) 74.0 - 97.0 FL    MCH 32.3 24.0 - 34.0 PG    MCHC 33.0 31.0 - 37.0 g/dL    RDW 12.2 11.6 - 14.5 %    PLATELET 788 781 - 697 K/uL    MPV 11.8 9.2 - 11.8 FL    NEUTROPHILS 85 (H) 40 - 73 %    LYMPHOCYTES 10 (L) 21 - 52 %    MONOCYTES 5 3 - 10 %    EOSINOPHILS 0 0 - 5 %    BASOPHILS 0 0 - 2 %    ABS. NEUTROPHILS 12.5 (H) 1.8 - 8.0 K/UL    ABS. LYMPHOCYTES 1.5 0.9 - 3.6 K/UL    ABS. MONOCYTES 0.7 0.05 - 1.2 K/UL    ABS. EOSINOPHILS 0.0 0.0 - 0.4 K/UL    ABS. BASOPHILS 0.0 0.0 - 0.1 K/UL    DF AUTOMATED     METABOLIC PANEL, COMPREHENSIVE    Collection Time: 12/03/20  3:28 PM   Result Value Ref Range    Sodium 138 136 - 145 mmol/L    Potassium 3.4 (L) 3.5 - 5.5 mmol/L    Chloride 102 100 - 111 mmol/L    CO2 25 21 - 32 mmol/L    Anion gap 11 3.0 - 18 mmol/L    Glucose 119 (H) 74 - 99 mg/dL    BUN 12 7.0 - 18 MG/DL    Creatinine 1.46 (H) 0.6 - 1.3 MG/DL    BUN/Creatinine ratio 8 (L) 12 - 20      GFR est AA >60 >60 ml/min/1.73m2    GFR est non-AA 55 (L) >60 ml/min/1.73m2    Calcium 10.4 (H) 8.5 - 10.1 MG/DL    Bilirubin, total 0.8 0.2 - 1.0 MG/DL    ALT (SGPT) 32 16 - 61 U/L    AST (SGOT) 25 10 - 38 U/L    Alk.  phosphatase 61 45 - 117 U/L    Protein, total 9.2 (H) 6.4 - 8.2 g/dL    Albumin 5.0 3.4 - 5.0 g/dL    Globulin 4.2 (H) 2.0 - 4.0 g/dL    A-G Ratio 1.2 0.8 - 1.7     LIPASE    Collection Time: 12/03/20  3:28 PM   Result Value Ref Range    Lipase 75 73 - 393 U/L   SARS-COV-2    Collection Time: 12/03/20  5:20 PM   Result Value Ref Range    Specimen source Nasopharyngeal      COVID-19 rapid test Not detected NOTD      Specimen type NP Swab      Health status NP Swab     CULTURE, BLOOD    Collection Time: 12/03/20  6:25 PM    Specimen: Blood   Result Value Ref Range    Special Requests: PERIPHERAL      Culture result: PENDING    POC LACTIC ACID    Collection Time: 12/03/20  6:29 PM   Result Value Ref Range    Lactic Acid (POC) 2.61 (HH) 0.40 - 2.00 mmol/L   CULTURE, BLOOD    Collection Time: 12/03/20  6:30 PM    Specimen: Blood   Result Value Ref Range    Special Requests: PERIPHERAL      Culture result: PENDING        images and reports reviewed    Assessment:   Huang Velásquez is a 28 y.o. male who is presenting with abdominal pain with nausea and vomiting of 1 day duration with a CT scan finding of acute appendicitis. I discussed with the patient the need for laparoscopic appendectomy and the risks of the surgery including bleeding and infection as well as leak and possible hernia. He agreed on proceeding with the surgery. Patient has already been admitted to the medicine service and he was already started on IV fluids and IV antibiotics. Plan:     [address] medicine admission. Laparoscopic appendectomy tonight  N.p.o.  IV fluids  IV antibiotics  Regular diet after the surgery. Discharge home tomorrow.      Please call me if you have any questions (cell phone: [phone])     Signed By: Liborio Felty, MD     December 3, 2020

## 2020-12-04 NOTE — ED NOTES
Report from [facility]. Urine specimen sent to lab by dayshift RN. Pt resting in bed at this time in NAD.

## 2020-12-04 NOTE — PROGRESS NOTES
Patient seen and examined. He is doing well. He said that his pain is minimal.  He had no issues overnight after the surgery. His abdomen is soft and his wounds are clean. His vitals are normal with no fever. He had one episode of tachycardia yesterday at 11 but none since. He has been voiding. His WBC has decreased from 15,000 to 7000 today. His creatinine is improving as well.     Plan:  Discharge today  No need for p.o. antibiotic to go home  Regular diet  I placed my discharge instructions  Follow-up with me in 2 weeks  I appreciate medicine admission and management

## 2020-12-04 NOTE — PROGRESS NOTES
After urinating earlier, patient reported that he felt as he still needed to urinate. Bladder scanner performed= 230 ml of urine in bladder. Explained to patient that I may have to perform a straight cath to obtain the urine in his bladder if he is unable to void. Patient will attempt to urinate again.

## 2020-12-04 NOTE — PERIOP NOTES
TRANSFER - IN REPORT:    Verbal report received from [address] [place] [address], RN  on Marielos Ba  being received from ED for ordered procedure      Report consisted of patients Situation, Background, Assessment and   Recommendations(SBAR). Information from the following report(s) ED Summary was reviewed with the receiving nurse. Opportunity for questions and clarification was provided. Assessment completed upon patients arrival to unit and care assumed.

## 2020-12-04 NOTE — PROGRESS NOTES
Problem: Discharge Planning  Goal: *Discharge to safe environment  Outcome: Progressing Towards Goal  Plan is home ( Moms home)    Reason for Admission:   appendicitis                   RUR Score:   11%                  Plan for utilizing home health:     no     PCP: First and Last name:  He does not remember his PCP name. He also sees the [facility] for care   Name of Practice:    Are you a current patient: Yes/No:    Approximate date of last visit:    Can you participate in a virtual visit with your PCP:                     Current Advanced Directive/Advance Care Plan: no. On post op pain meds now. Transition of Care Plan:     Chart reviewed and pt verified some information. He lives in [place] and came up to [place] to visit his Mom for Dona. He had surgery for appendicitis and wants to go home. I understand that he can not go home today due to lab work per patient. Per Staff , he has a positive Blood Culture. Patient was suppose to go home tomorrow, he will recuperate here, then go back home. Plan is home. Cirilo Griffin [phone]    Care Management Interventions  PCP Verified by CM: No  Palliative Care Criteria Met (RRAT>21 & CHF Dx)?: No  Mode of Transport at Discharge: Other (see comment)(Niece)  Transition of Care Consult (CM Consult): Discharge Planning  Current Support Network: Other(Lives in [place] and came to visit Mom for Dona)  Confirm Follow Up Transport: Family  The Plan for Transition of Care is Related to the Following Treatment Goals : meets post op goals  Discharge Location  Discharge Placement: Home(Will recover at his mom's house here in [place])       Of note, patient has CP[facility] for night use but [facility]. Discussed with Nurse and GEOVANNY albarran. [facility] Preference to Transfer form was signed by pt, copy to chart, copy to be faxed to the [facility]. Trying to print h/p, demosheet and notes, but printer acting up again. Rebecca Vallejo.  MICHELLE Londono, John, [address] [facility]  [phone], Pager [phone]  [email or web address]

## 2020-12-05 PROBLEM — R78.81 BACTEREMIA DUE TO GRAM-NEGATIVE BACTERIA: Status: ACTIVE | Noted: 2020-12-05

## 2020-12-05 LAB
ANION GAP SERPL CALC-SCNC: 7 MMOL/L (ref 3–18)
BASOPHILS # BLD: 0 K/UL (ref 0–0.1)
BASOPHILS NFR BLD: 0 % (ref 0–2)
BUN SERPL-MCNC: 10 MG/DL (ref 7–18)
BUN/CREAT SERPL: 8 (ref 12–20)
CALCIUM SERPL-MCNC: 8.8 MG/DL (ref 8.5–10.1)
CHLORIDE SERPL-SCNC: 103 MMOL/L (ref 100–111)
CO2 SERPL-SCNC: 26 MMOL/L (ref 21–32)
CREAT SERPL-MCNC: 1.31 MG/DL (ref 0.6–1.3)
DIFFERENTIAL METHOD BLD: ABNORMAL
EOSINOPHIL # BLD: 0 K/UL (ref 0–0.4)
EOSINOPHIL NFR BLD: 0 % (ref 0–5)
ERYTHROCYTE [DISTWIDTH] IN BLOOD BY AUTOMATED COUNT: 12.7 % (ref 11.6–14.5)
GLUCOSE SERPL-MCNC: 101 MG/DL (ref 74–99)
HCT VFR BLD AUTO: 40.1 % (ref 36–48)
HGB BLD-MCNC: 13.1 G/DL (ref 13–16)
LYMPHOCYTES # BLD: 0.6 K/UL (ref 0.9–3.6)
LYMPHOCYTES NFR BLD: 5 % (ref 21–52)
MCH RBC QN AUTO: 32.3 PG (ref 24–34)
MCHC RBC AUTO-ENTMCNC: 32.7 G/DL (ref 31–37)
MCV RBC AUTO: 98.8 FL (ref 74–97)
MONOCYTES # BLD: 0.5 K/UL (ref 0.05–1.2)
MONOCYTES NFR BLD: 5 % (ref 3–10)
NEUTS SEG # BLD: 9.8 K/UL (ref 1.8–8)
NEUTS SEG NFR BLD: 90 % (ref 40–73)
PLATELET # BLD AUTO: 165 K/UL (ref 135–420)
PMV BLD AUTO: 11.8 FL (ref 9.2–11.8)
POTASSIUM SERPL-SCNC: 3.7 MMOL/L (ref 3.5–5.5)
RBC # BLD AUTO: 4.06 M/UL (ref 4.7–5.5)
SODIUM SERPL-SCNC: 136 MMOL/L (ref 136–145)
WBC # BLD AUTO: 10.9 K/UL (ref 4.6–13.2)

## 2020-12-05 PROCEDURE — 80048 BASIC METABOLIC PNL TOTAL CA: CPT

## 2020-12-05 PROCEDURE — 74011250637 HC RX REV CODE- 250/637: Performed by: SURGERY

## 2020-12-05 PROCEDURE — 65270000029 HC RM PRIVATE

## 2020-12-05 PROCEDURE — 2709999900 HC NON-CHARGEABLE SUPPLY

## 2020-12-05 PROCEDURE — 85025 COMPLETE CBC W/AUTO DIFF WBC: CPT

## 2020-12-05 PROCEDURE — 74011250637 HC RX REV CODE- 250/637: Performed by: HOSPITALIST

## 2020-12-05 PROCEDURE — 99024 POSTOP FOLLOW-UP VISIT: CPT | Performed by: SURGERY

## 2020-12-05 PROCEDURE — 74011250636 HC RX REV CODE- 250/636: Performed by: HOSPITALIST

## 2020-12-05 PROCEDURE — 87040 BLOOD CULTURE FOR BACTERIA: CPT

## 2020-12-05 PROCEDURE — 94660 CPAP INITIATION&MGMT: CPT

## 2020-12-05 PROCEDURE — 74011000258 HC RX REV CODE- 258: Performed by: HOSPITALIST

## 2020-12-05 PROCEDURE — 36415 COLL VENOUS BLD VENIPUNCTURE: CPT

## 2020-12-05 PROCEDURE — 74011250636 HC RX REV CODE- 250/636: Performed by: INTERNAL MEDICINE

## 2020-12-05 PROCEDURE — 74011000250 HC RX REV CODE- 250: Performed by: INTERNAL MEDICINE

## 2020-12-05 RX ORDER — DOCUSATE SODIUM 100 MG/1
100 CAPSULE, LIQUID FILLED ORAL 2 TIMES DAILY
Status: DISCONTINUED | OUTPATIENT
Start: 2020-12-05 | End: 2020-12-08 | Stop reason: HOSPADM

## 2020-12-05 RX ADMIN — FAMOTIDINE 20 MG: 10 INJECTION INTRAVENOUS at 20:24

## 2020-12-05 RX ADMIN — Medication 10 ML: at 05:52

## 2020-12-05 RX ADMIN — DOCUSATE SODIUM 100 MG: 100 CAPSULE, LIQUID FILLED ORAL at 11:37

## 2020-12-05 RX ADMIN — OXYCODONE HYDROCHLORIDE AND ACETAMINOPHEN 1 TABLET: 5; 325 TABLET ORAL at 20:23

## 2020-12-05 RX ADMIN — OXYCODONE HYDROCHLORIDE AND ACETAMINOPHEN 1 TABLET: 5; 325 TABLET ORAL at 09:12

## 2020-12-05 RX ADMIN — OXYCODONE HYDROCHLORIDE AND ACETAMINOPHEN 1 TABLET: 5; 325 TABLET ORAL at 03:56

## 2020-12-05 RX ADMIN — OXYCODONE HYDROCHLORIDE AND ACETAMINOPHEN 1 TABLET: 5; 325 TABLET ORAL at 16:24

## 2020-12-05 RX ADMIN — SODIUM CHLORIDE, SODIUM LACTATE, POTASSIUM CHLORIDE, AND CALCIUM CHLORIDE 75 ML/HR: 600; 310; 30; 20 INJECTION, SOLUTION INTRAVENOUS at 03:57

## 2020-12-05 RX ADMIN — Medication 10 ML: at 21:36

## 2020-12-05 RX ADMIN — PIPERACILLIN AND TAZOBACTAM 3.38 G: 3; .375 INJECTION, POWDER, LYOPHILIZED, FOR SOLUTION INTRAVENOUS at 20:20

## 2020-12-05 RX ADMIN — Medication 10 ML: at 14:00

## 2020-12-05 RX ADMIN — FAMOTIDINE 20 MG: 10 INJECTION INTRAVENOUS at 09:13

## 2020-12-05 NOTE — PROGRESS NOTES
Problem: Discharge Planning  Goal: *Discharge to safe environment  Outcome: Progressing Towards Goal     Problem: Falls - Risk of  Goal: *Absence of Falls  Description: Document Darlene Manning Fall Risk and appropriate interventions in the flowsheet. Outcome: Progressing Towards Goal  Note: Fall Risk Interventions:            Medication Interventions: Patient to call before getting OOB                   Problem: Patient Education: Go to Patient Education Activity  Goal: Patient/Family Education  Outcome: Progressing Towards Goal     Problem: Patient Education: Go to Patient Education Activity  Goal: Patient/Family Education  Outcome: Progressing Towards Goal     Problem: Surgical Pathway Day of Surgery  Goal: Off Pathway (Use only if patient is Off Pathway)  Outcome: Progressing Towards Goal  Goal: Activity/Safety  Outcome: Progressing Towards Goal  Goal: Consults, if ordered  Outcome: Progressing Towards Goal  Goal: Nutrition/Diet  Outcome: Progressing Towards Goal  Goal: Medications  Outcome: Progressing Towards Goal  Goal: Respiratory  Outcome: Progressing Towards Goal  Goal: Treatments/Interventions/Procedures  Outcome: Progressing Towards Goal  Goal: Psychosocial  Outcome: Progressing Towards Goal  Goal: *No signs and symptoms of infection or wound complications  Outcome: Progressing Towards Goal  Goal: *Optimal pain control at patient's stated goal  Outcome: Progressing Towards Goal  Goal: *Adequate urinary output (equal to or greater than 30 milliliters/hour)  Description: Ambulatory Surgery patients voiding without difficulty.   Outcome: Progressing Towards Goal  Goal: *Hemodynamically stable  Outcome: Progressing Towards Goal  Goal: *Tolerating diet  Outcome: Progressing Towards Goal  Goal: *Demonstrates progressive activity  Outcome: Progressing Towards Goal

## 2020-12-05 NOTE — PROGRESS NOTES
12/04/20 3405   CPAP/BIPAP   CPAP/BIPAP Start/Stop On   Patient placed on CPAP, auto-titrating [id number] for QHS. Tolerating well at this time. SpO2 95% on 21%. RN aware. 0230: Patient removed CPAP. He does not want to go back on at this time. RN is aware. RT available if needed.

## 2020-12-05 NOTE — PROGRESS NOTES
Patient seen and examined. He stayed in the hospital because his blood cultures were positive and the medicine team decided to treat him with IV antibiotics. I passed by to check on the patient and see how he is doing. Surprisingly he stated he is having some abdominal pain mainly in the right lower quadrant. He also stated that he has not been passing any flatus or bowel movement. His vital signs are normal with no fever or tachycardia,  His WBC is normal.  His abdomen is slightly distended and mild tenderness in the right lower quadrant. I believe that the patient may have some picture of ileus. However because of his ruptured appendicitis is also at increased risk of intra-abdominal collection. I explained to him to take it slow with the diet though I feel it safe to continue with the regular diet for now.    I also wrote him for stool softener  Will follow

## 2020-12-05 NOTE — PROGRESS NOTES
Internal Medicine Progress Note    Patient's Name: Davis Dakins  Admit Date: 12/3/2020  Length of Stay: 2      Assessment/Plan     Active Hospital Problems    Diagnosis Date Noted    Bacteremia due to Gram-negative bacteria 12/05/2020    Acute appendicitis 12/03/2020    Substance abuse ([facility] [address] [address] 12/03/2020    SCOT (acute kidney injury) ([facility] [address] [facility] 12/03/2020    Hypercalcemia 12/03/2020    Hypokalemia 12/03/2020     - Afebrile, normal WBCs  - Cont IV zosyn  - Blood cult from 12/3 w/ GNR, repeat blood cult today  - t/c ID consult depending on speciation  - Trend CBC  - Cr appears to be at baseline  - Cont acceptable home medications for chronic conditions   - DVT protocol    I have personally reviewed all pertinent labs and films that have officially resulted over the last 24 hours. I have personally checked for all pending labs that are awaiting final results.     Subjective     Pt s/e @ bedside  No major events overnight  Has a mild abd pain, tolerating diet otherwise  Denies CP or SOB    Objective     Visit Vitals  BP (!) 143/97 (BP 1 Location: Right arm, BP Patient Position: At rest;Supine)   Pulse 98   Temp 98.8 °F (37.1 °C)   Resp 16   Ht 6' 4\" (1.93 m)   Wt 78 kg (172 lb)   SpO2 98%   BMI 20.94 kg/m²       Physical Exam:  General Appearance: NAD, conversant  Lungs: CTA with normal respiratory effort  CV: RRR, no m/r/g  Abdomen: soft, mild TTP around incisions, normal bowel sounds  Extremities: no cyanosis, no peripheral edema  Neuro: No focal deficits, motor/sensory intact    Lab/Data Reviewed:  BMP:   Lab Results   Component Value Date/Time     12/05/2020 03:35 AM    K 3.7 12/05/2020 03:35 AM     12/05/2020 03:35 AM    CO2 26 12/05/2020 03:35 AM    AGAP 7 12/05/2020 03:35 AM     (H) 12/05/2020 03:35 AM    BUN 10 12/05/2020 03:35 AM    CREA 1.31 (H) 12/05/2020 03:35 AM    GFRAA >60 12/05/2020 03:35 AM    GFRNA >60 12/05/2020 03:35 AM     CBC:   Lab Results   Component Value Date/Time    WBC 10.9 12/05/2020 03:35 AM    HGB 13.1 12/05/2020 03:35 AM    HCT 40.1 12/05/2020 03:35 AM     12/05/2020 03:35 AM       Imaging Reviewed:  No results found.     Medications Reviewed:  Current Facility-Administered Medications   Medication Dose Route Frequency    oxyCODONE-acetaminophen (PERCOCET) 5-325 mg per tablet 1 Tab  1 Tab Oral Q4H PRN    piperacillin-tazobactam (ZOSYN) 3.375 g in 0.9% sodium chloride (MBP/ADV) 100 mL MBP - extended-interval dosing######  3.375 g IntraVENous Q8H    lactated Ringers infusion  75 mL/hr IntraVENous CONTINUOUS    sodium chloride (NS) flush 5-40 mL  5-40 mL IntraVENous Q8H    sodium chloride (NS) flush 5-40 mL  5-40 mL IntraVENous PRN    acetaminophen (TYLENOL) tablet 650 mg  650 mg Oral Q6H PRN    Or    acetaminophen (TYLENOL) suppository 650 mg  650 mg Rectal Q6H PRN    polyethylene glycol (MIRALAX) packet 17 g  17 g Oral DAILY PRN    promethazine (PHENERGAN) tablet 12.5 mg  12.5 mg Oral Q6H PRN    Or    ondansetron (ZOFRAN) injection 4 mg  4 mg IntraVENous Q6H PRN    famotidine (PF) (PEPCID) 20 mg in 0.9% sodium chloride 10 mL injection  20 mg IntraVENous BID           Teo Mejia DO  Internal Medicine, Hospitalist  Pager: [phone]  [address] [place] [address]

## 2020-12-05 NOTE — PROGRESS NOTES
Bedside shift report received from Xuan Molina RN    2011: Mercy Hansen, on room air, resting in bed watching tv; IVF infusing well; incision sites clean with intact dressing; shift assessment done    2019: meds given; Percocet 1 tab given po as requested for pain- see flowsheet    2050: sleeping; no visual indications for pain noted    2150: sleeping comfortably; CPAP on; needs within reach    2250: quietly sleeping    0020: sleeping; Cpap on    0230: CPAP off; refused to put back on; advised by RT but still refused    0356: Percocet 1 tab given po as requested for pain- see flowsheet    0440: sleeping; no visual indicators for pain noted    0600: quietly resting in bed; IVF infusing well    0740: Bedside and Verbal shift change report given to Shalini Berger RN (oncoming nurse) by Joe Jung RN (offgoing nurse). Report included the following information SBAR, Kardex, Intake/Output and Recent Results.

## 2020-12-05 NOTE — PROGRESS NOTES
Pt arrived to floor via bed A&OX4 with purposeful movement in all extremities. Pt VSS, surgical site to abdomen is c/d/i and well approximated with dermabond BETTIE. Pt shows no signs of distress at this time. Will continue to monitor. 1000 Pt ambulated to bathroom with steady gait.  Pt has ambulated to the bathroom with steady gait with good urinary output. Will continue to monitor. 1940. Bedside and Verbal shift change report given to Alonzo Castelan (oncoming nurse) by this nurse Jasmin Hooper (offgoing nurse). Report included the following information SBAR, Kardex and MAR.

## 2020-12-05 NOTE — PROGRESS NOTES
Problem: Discharge Planning  Goal: *Discharge to safe environment  Outcome: Progressing Towards Goal     Problem: Discharge Planning  Goal: *Discharge to safe environment  Outcome: Progressing Towards Goal

## 2020-12-05 NOTE — PROGRESS NOTES
0915: PIV infiltrated. This nurse made x2 attempt at inserting PIV with no success. 0930: Supervisor made aware, will attempt when available    1000: Called ED, tech stated he will attempt when more staff arrive to unit    1115: PIV placed to left wrist 22g. ABT restarted    ~1900: Bedside and Verbal shift change report given to Paige Morales RN (oncoming nurse) by Jah Escobedo RN (offgoing nurse). Report included the following information SBAR, Kardex, Intake/Output, MAR, and Recent Results.

## 2020-12-05 NOTE — PROGRESS NOTES
Progress Note      Patient: Lashay Araujo               Sex: male          DOA: 12/3/2020       YOB: 1985      Age:  28 y.o.        LOS:  LOS: 1 day             CHIEF COMPLAINT:  Appendicitis, bacteremia    Subjective:     Patient feels good  No pain  No fever    Objective:      Visit Vitals  BP (!) 149/99 (BP 1 Location: Right arm, BP Patient Position: At rest)   Pulse 90   Temp 98.2 °F (36.8 °C)   Resp 16   Ht 6' 4\" (1.93 m)   Wt 78 kg (172 lb)   SpO2 100%   BMI 20.94 kg/m²       Physical Exam:  Gen:  No distress, no complaint  Lungs:  Clear bilaterally, no wheeze or rhonchi  Heart:  Regular rate and rhythm, no murmurs or gallops  Abdomen:  Soft, non-tender, normal bowel sounds        Lab/Data Reviewed: All lab results for the last 24 hours reviewed.         Assessment/Plan     Principal Problem:    Acute appendicitis (12/3/2020)    Active Problems:    Substance abuse ([facility] (12/3/2020)      SCOT (acute kidney injury) ([facility] (12/3/2020)      Hypercalcemia (12/3/2020)      Hypokalemia (12/3/2020)        Plan:  Not discharging today  Discussed with General Surgery  Discussed with patient and Mom at bedside  Continue with IV antibiotics  Repeat blood cultures

## 2020-12-06 LAB
ANION GAP SERPL CALC-SCNC: 7 MMOL/L (ref 3–18)
BASOPHILS # BLD: 0 K/UL (ref 0–0.1)
BASOPHILS NFR BLD: 0 % (ref 0–2)
BUN SERPL-MCNC: 12 MG/DL (ref 7–18)
BUN/CREAT SERPL: 11 (ref 12–20)
CALCIUM SERPL-MCNC: 9.1 MG/DL (ref 8.5–10.1)
CHLORIDE SERPL-SCNC: 101 MMOL/L (ref 100–111)
CO2 SERPL-SCNC: 26 MMOL/L (ref 21–32)
CREAT SERPL-MCNC: 1.09 MG/DL (ref 0.6–1.3)
DIFFERENTIAL METHOD BLD: ABNORMAL
EOSINOPHIL # BLD: 0 K/UL (ref 0–0.4)
EOSINOPHIL NFR BLD: 0 % (ref 0–5)
ERYTHROCYTE [DISTWIDTH] IN BLOOD BY AUTOMATED COUNT: 12.4 % (ref 11.6–14.5)
GLUCOSE SERPL-MCNC: 105 MG/DL (ref 74–99)
HCT VFR BLD AUTO: 40.1 % (ref 36–48)
HGB BLD-MCNC: 13.2 G/DL (ref 13–16)
LYMPHOCYTES # BLD: 0.5 K/UL (ref 0.9–3.6)
LYMPHOCYTES NFR BLD: 4 % (ref 21–52)
MCH RBC QN AUTO: 31.8 PG (ref 24–34)
MCHC RBC AUTO-ENTMCNC: 32.9 G/DL (ref 31–37)
MCV RBC AUTO: 96.6 FL (ref 74–97)
MONOCYTES # BLD: 0.6 K/UL (ref 0.05–1.2)
MONOCYTES NFR BLD: 4 % (ref 3–10)
NEUTS SEG # BLD: 12.2 K/UL (ref 1.8–8)
NEUTS SEG NFR BLD: 92 % (ref 40–73)
PLATELET # BLD AUTO: 146 K/UL (ref 135–420)
PMV BLD AUTO: 11.9 FL (ref 9.2–11.8)
POTASSIUM SERPL-SCNC: 3.6 MMOL/L (ref 3.5–5.5)
RBC # BLD AUTO: 4.15 M/UL (ref 4.7–5.5)
SODIUM SERPL-SCNC: 134 MMOL/L (ref 136–145)
WBC # BLD AUTO: 13.3 K/UL (ref 4.6–13.2)

## 2020-12-06 PROCEDURE — 85025 COMPLETE CBC W/AUTO DIFF WBC: CPT

## 2020-12-06 PROCEDURE — 99024 POSTOP FOLLOW-UP VISIT: CPT | Performed by: SURGERY

## 2020-12-06 PROCEDURE — 74011250636 HC RX REV CODE- 250/636: Performed by: SURGERY

## 2020-12-06 PROCEDURE — 74011000258 HC RX REV CODE- 258: Performed by: HOSPITALIST

## 2020-12-06 PROCEDURE — 80048 BASIC METABOLIC PNL TOTAL CA: CPT

## 2020-12-06 PROCEDURE — 77030027138 HC INCENT SPIROMETER -A

## 2020-12-06 PROCEDURE — 74011000250 HC RX REV CODE- 250: Performed by: INTERNAL MEDICINE

## 2020-12-06 PROCEDURE — 74011250637 HC RX REV CODE- 250/637: Performed by: SURGERY

## 2020-12-06 PROCEDURE — 94660 CPAP INITIATION&MGMT: CPT

## 2020-12-06 PROCEDURE — 74011250636 HC RX REV CODE- 250/636: Performed by: HOSPITALIST

## 2020-12-06 PROCEDURE — 74011250637 HC RX REV CODE- 250/637: Performed by: HOSPITALIST

## 2020-12-06 PROCEDURE — 74011250636 HC RX REV CODE- 250/636: Performed by: INTERNAL MEDICINE

## 2020-12-06 PROCEDURE — 36415 COLL VENOUS BLD VENIPUNCTURE: CPT

## 2020-12-06 PROCEDURE — 65270000029 HC RM PRIVATE

## 2020-12-06 PROCEDURE — 2709999900 HC NON-CHARGEABLE SUPPLY

## 2020-12-06 RX ORDER — SODIUM CHLORIDE, SODIUM LACTATE, POTASSIUM CHLORIDE, CALCIUM CHLORIDE 600; 310; 30; 20 MG/100ML; MG/100ML; MG/100ML; MG/100ML
100 INJECTION, SOLUTION INTRAVENOUS CONTINUOUS
Status: DISCONTINUED | OUTPATIENT
Start: 2020-12-06 | End: 2020-12-08 | Stop reason: HOSPADM

## 2020-12-06 RX ADMIN — PIPERACILLIN AND TAZOBACTAM 3.38 G: 3; .375 INJECTION, POWDER, LYOPHILIZED, FOR SOLUTION INTRAVENOUS at 20:35

## 2020-12-06 RX ADMIN — ONDANSETRON 4 MG: 2 INJECTION INTRAMUSCULAR; INTRAVENOUS at 01:29

## 2020-12-06 RX ADMIN — SODIUM CHLORIDE, SODIUM LACTATE, POTASSIUM CHLORIDE, AND CALCIUM CHLORIDE 100 ML/HR: 600; 310; 30; 20 INJECTION, SOLUTION INTRAVENOUS at 16:34

## 2020-12-06 RX ADMIN — ONDANSETRON 4 MG: 2 INJECTION INTRAMUSCULAR; INTRAVENOUS at 19:05

## 2020-12-06 RX ADMIN — PIPERACILLIN AND TAZOBACTAM 3.38 G: 3; .375 INJECTION, POWDER, LYOPHILIZED, FOR SOLUTION INTRAVENOUS at 11:09

## 2020-12-06 RX ADMIN — Medication 10 ML: at 05:33

## 2020-12-06 RX ADMIN — DOCUSATE SODIUM 100 MG: 100 CAPSULE, LIQUID FILLED ORAL at 08:47

## 2020-12-06 RX ADMIN — OXYCODONE HYDROCHLORIDE AND ACETAMINOPHEN 1 TABLET: 5; 325 TABLET ORAL at 01:25

## 2020-12-06 RX ADMIN — SODIUM CHLORIDE, SODIUM LACTATE, POTASSIUM CHLORIDE, AND CALCIUM CHLORIDE 1000 ML: 600; 310; 30; 20 INJECTION, SOLUTION INTRAVENOUS at 17:33

## 2020-12-06 RX ADMIN — PIPERACILLIN AND TAZOBACTAM 3.38 G: 3; .375 INJECTION, POWDER, LYOPHILIZED, FOR SOLUTION INTRAVENOUS at 03:57

## 2020-12-06 RX ADMIN — ONDANSETRON 4 MG: 2 INJECTION INTRAMUSCULAR; INTRAVENOUS at 08:47

## 2020-12-06 RX ADMIN — DOCUSATE SODIUM 100 MG: 100 CAPSULE, LIQUID FILLED ORAL at 17:32

## 2020-12-06 RX ADMIN — Medication 10 ML: at 22:45

## 2020-12-06 RX ADMIN — FAMOTIDINE 20 MG: 10 INJECTION INTRAVENOUS at 08:15

## 2020-12-06 RX ADMIN — OXYCODONE HYDROCHLORIDE AND ACETAMINOPHEN 1 TABLET: 5; 325 TABLET ORAL at 11:09

## 2020-12-06 RX ADMIN — FAMOTIDINE 20 MG: 10 INJECTION INTRAVENOUS at 20:35

## 2020-12-06 NOTE — ROUTINE PROCESS
1925 Bedside and Verbal shift change report given to Maya Valenzuela RN (oncoming nurse) by Torito Vargas RN (offgoing nurse). Report included the following information SBAR, Kardex, Intake/Output, and MAR. Pt received in bed. Pt is alert, no distress noted. No complaints of pain or discomfort. Call light is within reach, bedside table in reach. Bed in low position with wheels locked. 2023 pain assessed. Pt. complained of pain PRN percocet given. See MAR and pain assessment. 2200 Pt resting / sleeping in bed.     2324  pain assessed. No signs of distress noted. 0125 Pt. complained of pain and nausea PRN percocet given. See MAR and pain assessment. 0129 PRN zofran given. 0200 Pt sleeping in bed.    0357 pain assessed. 0720 Bedside and Verbal shift change report given to Ambar Rodriguez RN (oncoming nurse) by Maya Valenzuela RN (offgoing nurse). Report included the following information SBAR, Kardex, Intake/Output, and MAR.

## 2020-12-06 NOTE — PROGRESS NOTES
Patient seen and examined. He had 1 episode of vomiting today. In general he is feeling okay and less distended but still does not have full appetite. He has passed flatus but no bowel movement yet. His vitals shows no fever or tachycardia but he is hypertensive. His WBC has increased to 13,000 from 11,000. His abdomen is soft with mild distention but nontender. His trocar sites are healing well. Plan:  Appreciate medicine management and admission   Continue IV antibiotic for his positive blood culture   I believe the patient had ileus but it is getting better slowly however he is at high risk for intra-abdominal collection or leak but currently there is no evidence of any of that. I do not think there is any reason to order a CT scan of abdomen and pelvis for today because it is too early and there is no evidence of clear infection so I would like to wait. We will assess the situation tomorrow. Ambulation  I placed the patient n.p.o. except sips of clear liquid and ice for tonight and we will assess tomorrow  I placed an order for an order for 100 cc as well as 1 L bolus because of his renal function  Repeat labs tomorrow.   I placed order for CBC and BMP for tomorrow  Avoid narcotics  We will follow closely

## 2020-12-06 NOTE — PROGRESS NOTES
0800  Sitting on the recliner, very quite person, encourage and demonstrated about IS, raised up to 1000 ml, needed more encouragement, abdomen  Slightly puffy no pain at this time, not passing gas yet, encourage ambulation. 1000  Pt emesis of 100 cc, Zofran given, per pt pass once gas earlier, informed to walk more to get rid off the bloating, feels better after  emesis and the Zofran, no pain. 1230  Informed Dr Montrell Thornton pt  Elevated BP to see pt, he is in the floor now. On and off productive cough, no SOB, been up on the recliner, up walking to use the bathroom, not measuring,no order for BP, monitor    1600  Seen by Dr Kaylin Lopez, pt  was  vomiting about 50 cc, order for NPO with sips of clear only, offered Phenergan elsie refused at this time. IS encourageBolus of LR 1000  Cc started. Remove all food from the table    1830  Voided 500 cc, dark, feeling nauseated, to give him Zofran, told pt to call  For any  Emesis. BP elevated but denies headache and no dizziness. Rich Music of clear , taking it easy. Monitor , per pt had pass 2 x this morning.

## 2020-12-06 NOTE — PROGRESS NOTES
Internal Medicine Progress Note    Patient's Name: Sharon Ellison  Admit Date: 12/3/2020  Length of Stay: 3      Assessment/Plan     Active Hospital Problems    Diagnosis Date Noted    Bacteremia due to Gram-negative bacteria 12/05/2020    Acute appendicitis 12/03/2020    Substance abuse ([facility] 12/03/2020    SCOT (acute kidney injury) ([facility] 12/03/2020    Hypercalcemia 12/03/2020    Hypokalemia 12/03/2020     - Afebrile, small bump in WBCs  - Cont IV zosyn  - Blood cult from 12/3 w/ GNR  - 12/5 cult NGTD  - Plan for 5 more days of antibx (1/5)  - F/u surgery recs, concern for post-op ileus right now  - Trend CBC  - Cr improved down to 1.09  - Cont acceptable home medications for chronic conditions   - DVT protocol    I have personally reviewed all pertinent labs and films that have officially resulted over the last 24 hours. I have personally checked for all pending labs that are awaiting final results.     Subjective     Pt s/e @ bedside  No major events overnight  Still w/ some abd pain  No appetite  Had some dry heaving earlier  Denies CP or SOB    Objective     Visit Vitals  BP (!) 146/102   Pulse 82   Temp 98 °F (36.7 °C)   Resp 16   Ht 6' 4\" (1.93 m)   Wt 78 kg (172 lb)   SpO2 99%   BMI 20.94 kg/m²       Physical Exam:  General Appearance: NAD, conversant  Lungs: CTA with normal respiratory effort  CV: RRR, no m/r/g  Abdomen: soft, mild TTP around incisions, normal bowel sounds  Extremities: no cyanosis, no peripheral edema  Neuro: No focal deficits, motor/sensory intact    Lab/Data Reviewed:  BMP:   Lab Results   Component Value Date/Time     (L) 12/06/2020 02:45 AM    K 3.6 12/06/2020 02:45 AM     12/06/2020 02:45 AM    CO2 26 12/06/2020 02:45 AM    AGAP 7 12/06/2020 02:45 AM     (H) 12/06/2020 02:45 AM    BUN 12 12/06/2020 02:45 AM    CREA 1.09 12/06/2020 02:45 AM    GFRAA >60 12/06/2020 02:45 AM    GFRNA >60 12/06/2020 02:45 AM     CBC:   Lab Results   Component Value Date/Time WBC 13.3 (H) 12/06/2020 02:45 AM    HGB 13.2 12/06/2020 02:45 AM    HCT 40.1 12/06/2020 02:45 AM     12/06/2020 02:45 AM       Imaging Reviewed:  No results found.     Medications Reviewed:  Current Facility-Administered Medications   Medication Dose Route Frequency    docusate sodium (COLACE) capsule 100 mg  100 mg Oral BID    oxyCODONE-acetaminophen (PERCOCET) 5-325 mg per tablet 1 Tab  1 Tab Oral Q4H PRN    piperacillin-tazobactam (ZOSYN) 3.375 g in 0.9% sodium chloride (MBP/ADV) 100 mL MBP - extended-interval dosing######  3.375 g IntraVENous Q8H    sodium chloride (NS) flush 5-40 mL  5-40 mL IntraVENous Q8H    sodium chloride (NS) flush 5-40 mL  5-40 mL IntraVENous PRN    acetaminophen (TYLENOL) tablet 650 mg  650 mg Oral Q6H PRN    Or    acetaminophen (TYLENOL) suppository 650 mg  650 mg Rectal Q6H PRN    polyethylene glycol (MIRALAX) packet 17 g  17 g Oral DAILY PRN    promethazine (PHENERGAN) tablet 12.5 mg  12.5 mg Oral Q6H PRN    Or    ondansetron (ZOFRAN) injection 4 mg  4 mg IntraVENous Q6H PRN    famotidine (PF) (PEPCID) 20 mg in 0.9% sodium chloride 10 mL injection  20 mg IntraVENous BID           Jacki Osorio DO  Internal Medicine, Hospitalist  Pager: [phone]  [address] [place] [address]

## 2020-12-06 NOTE — PROGRESS NOTES
Problem: Discharge Planning  Goal: *Discharge to safe environment  Outcome: Progressing Towards Goal     Problem: Falls - Risk of  Goal: *Absence of Falls  Description: Document Kyra Sreedhar Fall Risk and appropriate interventions in the flowsheet.   Outcome: Progressing Towards Goal  Note: Fall Risk Interventions:            Medication Interventions: Patient to call before getting OOB, Teach patient to arise slowly                   Problem: Patient Education: Go to Patient Education Activity  Goal: Patient/Family Education  Outcome: Progressing Towards Goal     Problem: Patient Education: Go to Patient Education Activity  Goal: Patient/Family Education  Outcome: Progressing Towards Goal     Problem: Pain  Goal: *Control of Pain  Outcome: Progressing Towards Goal

## 2020-12-07 ENCOUNTER — APPOINTMENT (OUTPATIENT)
Dept: CT IMAGING | Age: 35
DRG: 853 | End: 2020-12-07
Attending: SURGERY
Payer: OTHER GOVERNMENT

## 2020-12-07 LAB
ANION GAP SERPL CALC-SCNC: 6 MMOL/L (ref 3–18)
BACTERIA SPEC CULT: ABNORMAL
BASOPHILS # BLD: 0 K/UL (ref 0–0.1)
BASOPHILS NFR BLD: 0 % (ref 0–2)
BUN SERPL-MCNC: 13 MG/DL (ref 7–18)
BUN/CREAT SERPL: 14 (ref 12–20)
CALCIUM SERPL-MCNC: 9 MG/DL (ref 8.5–10.1)
CHLORIDE SERPL-SCNC: 103 MMOL/L (ref 100–111)
CO2 SERPL-SCNC: 26 MMOL/L (ref 21–32)
CREAT SERPL-MCNC: 0.95 MG/DL (ref 0.6–1.3)
DIFFERENTIAL METHOD BLD: ABNORMAL
EOSINOPHIL # BLD: 0.1 K/UL (ref 0–0.4)
EOSINOPHIL NFR BLD: 1 % (ref 0–5)
ERYTHROCYTE [DISTWIDTH] IN BLOOD BY AUTOMATED COUNT: 12.2 % (ref 11.6–14.5)
GLUCOSE SERPL-MCNC: 105 MG/DL (ref 74–99)
GRAM STN SPEC: ABNORMAL
GRAM STN SPEC: ABNORMAL
HCT VFR BLD AUTO: 38.9 % (ref 36–48)
HGB BLD-MCNC: 13.1 G/DL (ref 13–16)
LYMPHOCYTES # BLD: 0.7 K/UL (ref 0.9–3.6)
LYMPHOCYTES NFR BLD: 7 % (ref 21–52)
MCH RBC QN AUTO: 32 PG (ref 24–34)
MCHC RBC AUTO-ENTMCNC: 33.7 G/DL (ref 31–37)
MCV RBC AUTO: 95.1 FL (ref 74–97)
MONOCYTES # BLD: 0.8 K/UL (ref 0.05–1.2)
MONOCYTES NFR BLD: 7 % (ref 3–10)
NEUTS SEG # BLD: 9.7 K/UL (ref 1.8–8)
NEUTS SEG NFR BLD: 85 % (ref 40–73)
PLATELET # BLD AUTO: 156 K/UL (ref 135–420)
PMV BLD AUTO: 11.9 FL (ref 9.2–11.8)
POTASSIUM SERPL-SCNC: 3.6 MMOL/L (ref 3.5–5.5)
RBC # BLD AUTO: 4.09 M/UL (ref 4.7–5.5)
SERVICE CMNT-IMP: ABNORMAL
SODIUM SERPL-SCNC: 135 MMOL/L (ref 136–145)
WBC # BLD AUTO: 11.4 K/UL (ref 4.6–13.2)

## 2020-12-07 PROCEDURE — 74011250636 HC RX REV CODE- 250/636: Performed by: SURGERY

## 2020-12-07 PROCEDURE — 85025 COMPLETE CBC W/AUTO DIFF WBC: CPT

## 2020-12-07 PROCEDURE — 74011000250 HC RX REV CODE- 250: Performed by: INTERNAL MEDICINE

## 2020-12-07 PROCEDURE — 36415 COLL VENOUS BLD VENIPUNCTURE: CPT

## 2020-12-07 PROCEDURE — 65270000029 HC RM PRIVATE

## 2020-12-07 PROCEDURE — 74011250636 HC RX REV CODE- 250/636: Performed by: INTERNAL MEDICINE

## 2020-12-07 PROCEDURE — 80048 BASIC METABOLIC PNL TOTAL CA: CPT

## 2020-12-07 PROCEDURE — 74011000258 HC RX REV CODE- 258: Performed by: HOSPITALIST

## 2020-12-07 PROCEDURE — 74011000636 HC RX REV CODE- 636: Performed by: HOSPITALIST

## 2020-12-07 PROCEDURE — 94761 N-INVAS EAR/PLS OXIMETRY MLT: CPT

## 2020-12-07 PROCEDURE — 74177 CT ABD & PELVIS W/CONTRAST: CPT

## 2020-12-07 PROCEDURE — 2709999900 HC NON-CHARGEABLE SUPPLY

## 2020-12-07 PROCEDURE — 74011250636 HC RX REV CODE- 250/636: Performed by: HOSPITALIST

## 2020-12-07 PROCEDURE — 99024 POSTOP FOLLOW-UP VISIT: CPT | Performed by: SURGERY

## 2020-12-07 RX ADMIN — IOPAMIDOL 100 ML: 612 INJECTION, SOLUTION INTRAVENOUS at 12:04

## 2020-12-07 RX ADMIN — PIPERACILLIN AND TAZOBACTAM 3.38 G: 3; .375 INJECTION, POWDER, LYOPHILIZED, FOR SOLUTION INTRAVENOUS at 21:10

## 2020-12-07 RX ADMIN — Medication 10 ML: at 21:11

## 2020-12-07 RX ADMIN — Medication 10 ML: at 05:55

## 2020-12-07 RX ADMIN — PIPERACILLIN AND TAZOBACTAM 3.38 G: 3; .375 INJECTION, POWDER, LYOPHILIZED, FOR SOLUTION INTRAVENOUS at 12:22

## 2020-12-07 RX ADMIN — PIPERACILLIN AND TAZOBACTAM 3.38 G: 3; .375 INJECTION, POWDER, LYOPHILIZED, FOR SOLUTION INTRAVENOUS at 04:05

## 2020-12-07 RX ADMIN — SODIUM CHLORIDE, SODIUM LACTATE, POTASSIUM CHLORIDE, AND CALCIUM CHLORIDE 100 ML/HR: 600; 310; 30; 20 INJECTION, SOLUTION INTRAVENOUS at 06:00

## 2020-12-07 RX ADMIN — FAMOTIDINE 20 MG: 10 INJECTION INTRAVENOUS at 08:33

## 2020-12-07 RX ADMIN — Medication 10 ML: at 13:59

## 2020-12-07 RX ADMIN — FAMOTIDINE 20 MG: 10 INJECTION INTRAVENOUS at 21:11

## 2020-12-07 RX ADMIN — SODIUM CHLORIDE, SODIUM LACTATE, POTASSIUM CHLORIDE, AND CALCIUM CHLORIDE 100 ML/HR: 600; 310; 30; 20 INJECTION, SOLUTION INTRAVENOUS at 04:06

## 2020-12-07 NOTE — ROUTINE PROCESS
Bedside and Verbal shift change report given to Henry Edwards RN, BSN (oncoming nurse) by Paola Montez RN, BSN (offgoing nurse). Report included the following information SBAR, Kardex, ED Summary, OR Summary, Procedure Summary, Intake/Output, MAR and Recent Results.      Henry Edwards RN, BSN

## 2020-12-07 NOTE — PROGRESS NOTES
conducted an initial consultation and Spiritual Assessment for Gilles Rodriguez, who is a 28 y. o.,male. Patients Primary Language is: [place]. According to the patients EMR Scientologist Affiliation is: Unknown. The reason the Patient came to the hospital is:   Patient Active Problem List    Diagnosis Date Noted    Bacteremia due to Gram-negative bacteria 12/05/2020    Acute appendicitis 12/03/2020    Substance abuse ([facility] 12/03/2020    SCOT (acute kidney injury) ([facility] 12/03/2020    Hypercalcemia 12/03/2020    Hypokalemia 12/03/2020        The  provided the following Interventions:  Initiated a relationship of care and support with patient in room 2408 as patient was setting up on the side of the bed. Patient says he is ready to get out of here . Listened empathically to his story and desires. Provided information about Spiritual Care Services. Offered prayer and assurance of continued prayers on patients behalf. The following outcomes were achieved:  Patient shared limited information about his medical narrative and spiritual journey/beliefs. Patient processed feeling about current hospitalization. Patient expressed gratitude for pastoral care visit. Assessment:  Patient does not have any Buddhist/cultural needs that will affect patients preferences in health care. There are no further spiritual or Buddhist issues which require Spiritual Care Services interventions at this time. Plan:  Chaplains will continue to follow and will provide pastoral care on an as needed/requested basis    . [facility]   Spiritual Care   [phone]

## 2020-12-07 NOTE — PROGRESS NOTES
Internal Medicine Progress Note    Patient's Name: Tia White  Admit Date: 12/3/2020  Length of Stay: 4      Assessment/Plan     Active Hospital Problems    Diagnosis Date Noted    Bacteremia due to Gram-negative bacteria 12/05/2020    Acute appendicitis 12/03/2020    Substance abuse ([facility] 12/03/2020    SCOT (acute kidney injury) ([facility] 12/03/2020    Hypercalcemia 12/03/2020    Hypokalemia 12/03/2020     - Afebrile, WBCs WNL  - Cont IV zosyn  - Blood cult from 12/3 w/ GNR, awaiting speciation  - 12/5 cult NGTD  - Plan for 5 more days of antibx (2/5)  - F/u surgery recs, concern for post-op ileus right now vs drainable collection in abd  - CT abd/pelvis pending  - Trend CBC  - Cr at baseline  - Cont acceptable home medications for chronic conditions   - DVT protocol    I have personally reviewed all pertinent labs and films that have officially resulted over the last 24 hours. I have personally checked for all pending labs that are awaiting final results.     Subjective     Pt s/e @ bedside  No major events overnight  Mild abd pain but ok  Passing flatus  Vomited overnight, no nausea  Denies CP or SOB    Objective     Visit Vitals  BP (!) 149/99 (BP 1 Location: Right arm, BP Patient Position: Lying left side)   Pulse 78   Temp 99.3 °F (37.4 °C)   Resp 18   Ht 6' 4\" (1.93 m)   Wt 78 kg (172 lb)   SpO2 100%   BMI 20.94 kg/m²       Physical Exam:  General Appearance: NAD, conversant  Lungs: CTA with normal respiratory effort  CV: RRR, no m/r/g  Abdomen: soft, mild TTP around incisions, + distension, wound C/D/I  Extremities: no cyanosis, no peripheral edema  Neuro: No focal deficits, motor/sensory intact    Lab/Data Reviewed:  BMP:   Lab Results   Component Value Date/Time     (L) 12/07/2020 04:30 AM    K 3.6 12/07/2020 04:30 AM     12/07/2020 04:30 AM    CO2 26 12/07/2020 04:30 AM    AGAP 6 12/07/2020 04:30 AM     (H) 12/07/2020 04:30 AM    BUN 13 12/07/2020 04:30 AM    CREA 0.95 12/07/2020 04:30 AM    GFRAA >60 12/07/2020 04:30 AM    GFRNA >60 12/07/2020 04:30 AM     CBC:   Lab Results   Component Value Date/Time    WBC 11.4 12/07/2020 04:30 AM    HGB 13.1 12/07/2020 04:30 AM    HCT 38.9 12/07/2020 04:30 AM     12/07/2020 04:30 AM       Imaging Reviewed:  No results found.     Medications Reviewed:  Current Facility-Administered Medications   Medication Dose Route Frequency    lactated Ringers infusion  100 mL/hr IntraVENous CONTINUOUS    docusate sodium (COLACE) capsule 100 mg  100 mg Oral BID    oxyCODONE-acetaminophen (PERCOCET) 5-325 mg per tablet 1 Tab  1 Tab Oral Q4H PRN    piperacillin-tazobactam (ZOSYN) 3.375 g in 0.9% sodium chloride (MBP/ADV) 100 mL MBP - extended-interval dosing######  3.375 g IntraVENous Q8H    sodium chloride (NS) flush 5-40 mL  5-40 mL IntraVENous Q8H    sodium chloride (NS) flush 5-40 mL  5-40 mL IntraVENous PRN    acetaminophen (TYLENOL) tablet 650 mg  650 mg Oral Q6H PRN    Or    acetaminophen (TYLENOL) suppository 650 mg  650 mg Rectal Q6H PRN    polyethylene glycol (MIRALAX) packet 17 g  17 g Oral DAILY PRN    promethazine (PHENERGAN) tablet 12.5 mg  12.5 mg Oral Q6H PRN    Or    ondansetron (ZOFRAN) injection 4 mg  4 mg IntraVENous Q6H PRN    famotidine (PF) (PEPCID) 20 mg in 0.9% sodium chloride 10 mL injection  20 mg IntraVENous BID           Jan Johnston DO  Internal Medicine, Hospitalist  Pager: [phone]  [address] [place] [place] [address]

## 2020-12-07 NOTE — PROGRESS NOTES
Patient seen and examined. He is stating that he is feeling well. He said that he is passing some flatus but no bowel movement. He denies any abdominal pain. He denies any nausea and he did not vomit overnight however he is still slightly mildly distended. His vital signs showed a fever this morning at 4 of 38.2  However his WBC has decreased from 13,000-11,000. His creatinine has been improving and today it is 0.9. His abdomen is distended but soft. His wounds are healing well. Impression/plan:    The patient is currently postop day 4 from a laparoscopic appendectomy for ruptured appendicitis with a positive blood culture. The patient has been slightly bloated and he vomited yesterday and today he had fever. Though his WBC is improving and his creatinine is back to normal I believe it is best to get a CT scan of abdomen and pelvis today to rule out any drainable collection especially that he had a ruptured appendicitis. Also I believe the patient has some mild ileus but the CT scan will also help to rule out any obstruction especially that his distal ileum was very stuck to the appendix. If the CT scan did not show any drainable collection and the patient continued to pass flatus and no nausea then we can start him on diet but we will wait for the results. I appreciate the medicine management  Continue with the IV antibiotics for now  I placed the order for CT scan of abdomen and pelvis.    Ambulation  We will follow closely

## 2020-12-07 NOTE — ROUTINE PROCESS
Bedside and Verbal shift change report given to Mane Pascual   (oncoming nurse) by Rosa Isela Monteiro RN   (offgoing nurse). Report included the following information SBAR, Kardex and Intake/Output.

## 2020-12-07 NOTE — PROGRESS NOTES
Physician Progress Note      PATIENT:               Rosa Mazariegos  CSN #:                  [id number]  :                       1985  ADMIT DATE:       12/3/2020 3:15 PM  DISCH DATE:  RESPONDING  PROVIDER #:        EUGENE CRAWFORD DO          QUERY TEXT:    Pt admitted with  Acute ruptured appendicitis. Pt noted to have Bacteremia due to Gram-negative bacteria and SCOT with admitting WBC of 14.8. If possible, please document in the progress notes and discharge summary if you are evaluating and /or treating any of the following: The medical record reflects the following:  Risk Factors: Acute ruptured appendicitis  Clinical Indicators: 12/3 WBC 14.8 and afebrile ( today wbc 11.4;  100.7 ? F)  . SCOT. 12/3 bl cx 2 of 2  with GNR.;  lactic acid 1.3. Treatment: IV Zosyn    Thank you,  Anupama Lorenzo RN, CCDS  [phone]  Options provided:  -- Gram Neg Sepsis with associated SCOT, present on admission  -- Gram Neg Sepsis with associated SCOT, not present on admission. -- No Sepsis,  localized infection from ruptured appendicitis only  -- Other - I will add my own diagnosis  -- Disagree - Not applicable / Not valid  -- Disagree - Clinically unable to determine / Unknown  -- Refer to Clinical Documentation Reviewer    PROVIDER RESPONSE TEXT:    This patient Gram Neg Sepsis with associated SCOT which was present on admission. Query created by:  Ezra uDenas on 2020 11:16 AM      Electronically signed by:  Merritt Handley DO 2020 11:35 AM

## 2020-12-07 NOTE — PROGRESS NOTES
Problem: Discharge Planning  Goal: *Discharge to safe environment  Outcome: Progressing Towards Goal     Problem: Falls - Risk of  Goal: *Absence of Falls  Description: Document Pedro Pablo Gibson Fall Risk and appropriate interventions in the flowsheet. Outcome: Progressing Towards Goal  Note: Fall Risk Interventions:            Medication Interventions: Teach patient to arise slowly                   Problem: Patient Education: Go to Patient Education Activity  Goal: Patient/Family Education  Outcome: Progressing Towards Goal     Problem: Patient Education: Go to Patient Education Activity  Goal: Patient/Family Education  Outcome: Progressing Towards Goal     Problem: Pain  Goal: *Control of Pain  Outcome: Progressing Towards Goal     Problem: Pressure Injury - Risk of  Goal: *Prevention of pressure injury  Description: Document Homer Scale and appropriate interventions in the flowsheet.   Outcome: Progressing Towards Goal  Note: Pressure Injury Interventions:  Sensory Interventions: Assess changes in LOC         Activity Interventions: Increase time out of bed, Pressure redistribution bed/mattress(bed type)    Mobility Interventions: HOB 30 degrees or less, Pressure redistribution bed/mattress (bed type)    Nutrition Interventions: Document food/fluid/supplement intake, Offer support with meals,snacks and hydration                     Problem: Patient Education: Go to Patient Education Activity  Goal: Patient/Family Education  Outcome: Progressing Towards Goal

## 2020-12-07 NOTE — ROUTINE PROCESS
Bedside and Verbal shift change report given to Peace Cho, MSN and Brenda Murphy RN (oncoming nurse) by Ivonne Loyd RN, BSN (offgoing nurse). Report included the following information SBAR, Kardex, ED Summary, OR Summary, Procedure Summary, Intake/Output, MAR and Recent Results.      Ivonne Loyd RN, BSN

## 2020-12-07 NOTE — PROGRESS NOTES
Bedside, Verbal and Written shift change report given to Tanja Pearce preceptor (oncoming nurse) by Wadie Boeck (offgoing nurse). Report included the following information SBAR, Kardex, Intake/Output, MAR and Recent Results. 8300 Initial assessment completed see flow sheet. Patient AOx4 20g PIV lft f arm running LR at 50ml/hr. Patient has 3 lap sites to abdomen approximated CDI with skin glue. Due meds given see MAR, pateint refused colace. Patient resting in bed, call light in reach, no issues noted. 0930 Patient resting in bed, call light in reach, no issues noted. 1135 Patient left floor via wheelchair with transport to CT.  1220 Patient back in room no issues. 1222 Due med given by Anuradha Mccall RN see MAR.   1350 Patient resting in bed, call light in reach, no issues noted. 1445 Patient sitting in chair call light in reach no issues. 1542 Reassessed patient /101, manually BP is 160/90. Patient BP has been outside limits during admission. Patient stable resting in bed no issues. 1600 Perfect serve to MD Dorinda Sheffield for any orders regarding patient BP.   1601 Perfect serve read no orders at this time. [id number] Pt refused colace, states he is having diarrhea. Patient resting in bed, call light in reach, no issues noted. 1933 Bedside, Verbal and Written shift change report given to Jair Choi (oncoming nurse) by Hayden Brasher RN preceptor  (offgoing nurse). Report included the following information SBAR, Kardex, Intake/Output, MAR and Recent Results.

## 2020-12-07 NOTE — PROGRESS NOTES
Plan home to moms here in [address] [place] [address] to recover,then return to own home in [place].

## 2020-12-07 NOTE — PROGRESS NOTES
Scheduled abx given, visitor at bedside    5917-Dr. Isidro Valadez called with telephone order for reg diet.  RBV

## 2020-12-07 NOTE — PROGRESS NOTES
Attempted to put patient on CPAP for QHS. Patient refuses to wear at this time. RN is aware. RN Will call if needed.

## 2020-12-08 VITALS
DIASTOLIC BLOOD PRESSURE: 99 MMHG | BODY MASS INDEX: 20.95 KG/M2 | HEART RATE: 70 BPM | TEMPERATURE: 98.3 F | RESPIRATION RATE: 18 BRPM | SYSTOLIC BLOOD PRESSURE: 156 MMHG | OXYGEN SATURATION: 100 % | HEIGHT: 76 IN | WEIGHT: 172 LBS

## 2020-12-08 LAB
ANION GAP SERPL CALC-SCNC: 9 MMOL/L (ref 3–18)
BASOPHILS # BLD: 0 K/UL (ref 0–0.1)
BASOPHILS NFR BLD: 0 % (ref 0–2)
BUN SERPL-MCNC: 11 MG/DL (ref 7–18)
BUN/CREAT SERPL: 11 (ref 12–20)
CALCIUM SERPL-MCNC: 9.4 MG/DL (ref 8.5–10.1)
CHLORIDE SERPL-SCNC: 100 MMOL/L (ref 100–111)
CO2 SERPL-SCNC: 23 MMOL/L (ref 21–32)
CREAT SERPL-MCNC: 0.99 MG/DL (ref 0.6–1.3)
DIFFERENTIAL METHOD BLD: ABNORMAL
EOSINOPHIL # BLD: 0.3 K/UL (ref 0–0.4)
EOSINOPHIL NFR BLD: 3 % (ref 0–5)
ERYTHROCYTE [DISTWIDTH] IN BLOOD BY AUTOMATED COUNT: 12.6 % (ref 11.6–14.5)
GLUCOSE SERPL-MCNC: 78 MG/DL (ref 74–99)
HCT VFR BLD AUTO: 44.2 % (ref 36–48)
HGB BLD-MCNC: 14.5 G/DL (ref 13–16)
LYMPHOCYTES # BLD: 1.6 K/UL (ref 0.9–3.6)
LYMPHOCYTES NFR BLD: 16 % (ref 21–52)
MCH RBC QN AUTO: 31.9 PG (ref 24–34)
MCHC RBC AUTO-ENTMCNC: 32.8 G/DL (ref 31–37)
MCV RBC AUTO: 97.1 FL (ref 74–97)
MONOCYTES # BLD: 1.4 K/UL (ref 0.05–1.2)
MONOCYTES NFR BLD: 13 % (ref 3–10)
NEUTS SEG # BLD: 6.9 K/UL (ref 1.8–8)
NEUTS SEG NFR BLD: 68 % (ref 40–73)
PLATELET # BLD AUTO: 163 K/UL (ref 135–420)
PMV BLD AUTO: 11.8 FL (ref 9.2–11.8)
POTASSIUM SERPL-SCNC: 3.6 MMOL/L (ref 3.5–5.5)
RBC # BLD AUTO: 4.55 M/UL (ref 4.7–5.5)
SODIUM SERPL-SCNC: 132 MMOL/L (ref 136–145)
WBC # BLD AUTO: 10.2 K/UL (ref 4.6–13.2)

## 2020-12-08 PROCEDURE — 74011250636 HC RX REV CODE- 250/636: Performed by: SURGERY

## 2020-12-08 PROCEDURE — 74011250636 HC RX REV CODE- 250/636: Performed by: HOSPITALIST

## 2020-12-08 PROCEDURE — 74011250636 HC RX REV CODE- 250/636: Performed by: INTERNAL MEDICINE

## 2020-12-08 PROCEDURE — 99024 POSTOP FOLLOW-UP VISIT: CPT | Performed by: SURGERY

## 2020-12-08 PROCEDURE — 85025 COMPLETE CBC W/AUTO DIFF WBC: CPT

## 2020-12-08 PROCEDURE — 36415 COLL VENOUS BLD VENIPUNCTURE: CPT

## 2020-12-08 PROCEDURE — 2709999900 HC NON-CHARGEABLE SUPPLY

## 2020-12-08 PROCEDURE — 80048 BASIC METABOLIC PNL TOTAL CA: CPT

## 2020-12-08 PROCEDURE — 74011000250 HC RX REV CODE- 250: Performed by: INTERNAL MEDICINE

## 2020-12-08 PROCEDURE — 74011000258 HC RX REV CODE- 258: Performed by: HOSPITALIST

## 2020-12-08 PROCEDURE — 94660 CPAP INITIATION&MGMT: CPT

## 2020-12-08 RX ORDER — OXYCODONE AND ACETAMINOPHEN 5; 325 MG/1; MG/1
1 TABLET ORAL
Qty: 8 TAB | Refills: 0 | Status: SHIPPED | OUTPATIENT
Start: 2020-12-08 | End: 2020-12-11

## 2020-12-08 RX ORDER — CEPHALEXIN 500 MG/1
500 CAPSULE ORAL 4 TIMES DAILY
Qty: 16 CAP | Refills: 0 | Status: SHIPPED | OUTPATIENT
Start: 2020-12-08 | End: 2020-12-12

## 2020-12-08 RX ADMIN — FAMOTIDINE 20 MG: 10 INJECTION INTRAVENOUS at 08:49

## 2020-12-08 RX ADMIN — PIPERACILLIN AND TAZOBACTAM 3.38 G: 3; .375 INJECTION, POWDER, LYOPHILIZED, FOR SOLUTION INTRAVENOUS at 04:48

## 2020-12-08 RX ADMIN — SODIUM CHLORIDE, SODIUM LACTATE, POTASSIUM CHLORIDE, AND CALCIUM CHLORIDE 100 ML/HR: 600; 310; 30; 20 INJECTION, SOLUTION INTRAVENOUS at 04:48

## 2020-12-08 RX ADMIN — Medication 10 ML: at 06:00

## 2020-12-08 NOTE — PROGRESS NOTES
~0700: Patient received resting in bed, A+Ox4, able to communicate and make needs known. No s/s of distress noted. Bed locked and in lowest position. Call bell/phone in reach, verbalized understanding on how to use for assistance. 1030: Discharge instructions given. Educated patient, verbalized understanding. Answered all questions/concers. Mom at bedside. PIV removed. Pt getting dressed. No c/o pain. S/s of distress. ~1100: Pt dad picked up pt and mom, stable upon dc.

## 2020-12-08 NOTE — PROGRESS NOTES
1930    Bedside, Verbal, and Written shift change report given to [address] [place] [address] (oncoming nurse) by [facility] (offgoing nurse). Report included the following information SBAR, Kardex, and MAR.     2000  Patient is in bed, alert and oriented. Room air, IV CDI, dressing CDI, no sign of distress. SCD refused. Patient repositioned. Bed low, call bell within reach. 2100  Patient is in bed, sleeping. No sign of distress. Patient is on his CPAP. Bed low, call bell within reach. 2200  Patient is in bed, resting/ sleeping. Patient repositioned. No sign of distress. Bed low, call bell within reach. 2300  Patient is in bed, sleeping. No sign of distress. Reports having BM. Bed low, call bell within reach. 0000  Patient is in bed, alert and oriented. On CPAP, IV CDI, dressing CDI, no sign of distress. Patient repositioned. Bed low, call bell within reach. 0100  Patient is in bed, sleeping. Reports having loose stools. No sign of distress. Denies pain. 0200  Patient is in bed, sleeping. Patient repositioned. No sign of distress. 0300  Patient is in bed, resting. Had the CPAP removed by RT, he said the cord was too short and he could not even turned. Denies any pain. No sign of distress. Bed low, call bell within reach. 0400  Patient is in bed, alert and oriented. Room air, IV CDI, dressing CDI, no sign of distress. Patient repositioned. Bed low, call bell within reach. 0500  Patient offered assistance with hygiene. Refused. 0600 Trash emptied, given water. Patient repositioned. No sign of distress. 0700  Patient is in bed resting/ sleeping. Patient has no complaint. 0730  Bedside, Verbal, and Written shift change report given to [address] (oncoming nurse) by [address] [place] [address] (offgoing nurse). Report included the following information SBAR, Kardex, and MAR.

## 2020-12-08 NOTE — PROGRESS NOTES
Problem: Discharge Planning  Goal: *Discharge to safe environment  Outcome: Progressing Towards Goal     Problem: Falls - Risk of  Goal: *Absence of Falls  Description: Document Simi Syed Fall Risk and appropriate interventions in the flowsheet. Outcome: Progressing Towards Goal  Note: Fall Risk Interventions:            Medication Interventions: Bed/chair exit alarm                   Problem: Patient Education: Go to Patient Education Activity  Goal: Patient/Family Education  Outcome: Progressing Towards Goal     Problem: Patient Education: Go to Patient Education Activity  Goal: Patient/Family Education  Outcome: Progressing Towards Goal     Problem: Pain  Goal: *Control of Pain  Outcome: Progressing Towards Goal     Problem: Pressure Injury - Risk of  Goal: *Prevention of pressure injury  Description: Document Homer Scale and appropriate interventions in the flowsheet.   Outcome: Progressing Towards Goal  Note: Pressure Injury Interventions:  Sensory Interventions: Keep linens dry and wrinkle-free         Activity Interventions: Pressure redistribution bed/mattress(bed type)    Mobility Interventions: Pressure redistribution bed/mattress (bed type)    Nutrition Interventions: Document food/fluid/supplement intake                     Problem: Patient Education: Go to Patient Education Activity  Goal: Patient/Family Education  Outcome: Progressing Towards Goal

## 2020-12-08 NOTE — PROGRESS NOTES
Patient seen and examined. He is doing much better. Passing flatus and had multiple bowel movements. No nausea and tolerating regular diet. Had fever yesterday but none since. No tachycardia. WBC is normal.  Abdomen is soft and no-tender, and wounds are clean    Plan:  [address] today if ok with primary team  Follow up with me in couple weeks.

## 2020-12-08 NOTE — DISCHARGE SUMMARY
Internal Medicine Discharge Summary        Patient: Bere Valle    YOB: 1985    Age:  28 y.o. Admit Date: 12/3/2020    Discharge Date: 12/8/2020    LOS:  LOS: 5 days     Discharge To: Home    Consults: General Surgery    Admission Diagnoses: Acute appendicitis [K35.80]    Discharge Diagnoses:    Problem List as of 12/8/2020 Never Reviewed          Codes Class Noted - Resolved    Bacteremia due to Gram-negative bacteria ICD-10-CM: R78.81  ICD-9-CM: 790.7, 041.85  12/5/2020 - Present        * (Principal) Acute appendicitis ICD-10-CM: K35.80  ICD-9-CM: 540.9  12/3/2020 - Present        Substance abuse ([facility] ICD-10-CM: F19.10  ICD-9-CM: 305.90  12/3/2020 - Present        SCOT (acute kidney injury) ([facility] ICD-10-CM: N17.9  ICD-9-CM: 584.9  12/3/2020 - Present        Hypercalcemia ICD-10-CM: O60.35  ICD-9-CM: 275.42  12/3/2020 - Present        Hypokalemia ICD-10-CM: E87.6  ICD-9-CM: 276.8  12/3/2020 - Present              Discharge Condition:  Improved    Procedures: Appendectomy         HPI: Mr. Baljinder Rodriguez is a 28 y.o.  male who is admitted for acute appendicitis . Mr. Baljinder Rodriguez with past medical history as noted below , presented to the Emergency Department today  complaining of above, co sever lower abdominal pains mostly on R side with tenderness. Report fever and chills earlier today and now feel cold, getting better on pain meds given in er. Sever pains started this am. CT scan abd reported picture of appendicitis. Started on iv zosyn in er. Triage and ER notes noted. ER MD wanted to admit the patient to the hospital for further management and called hospitalist to facilitate this process. Surgery dw ER provider and requested npo now and iv Abx. Deny been on regular home medications. He admit drinks 4 drinks of Koniac a day , smoke tobacco and weeds. No exposure to COVID. Blood culture sent from ER. WBC elevated .     Hospital Course:    #Ecoli Bacteremia 2/2 Acute appendicitis (12/3/2020). Dw surgeon on call. Patient taken to surgery for above procedure. Developed some post-op ileus, but this resolved and patient tolerating diet. General surgery cleared for discharge. Patient treated with IV zosyn during inpatient with transition to oral keflex for 4 additional days at discharge     #   Substance abuse ([facility] (12/3/2020). Urine drug. Counseled on quit substances. Alcohol and smoking, weeds.      #  SCOT (acute kidney injury) ([facility] (12/3/2020). IVF. Serial labs. Avoid nephro toxins. Resolved with IV fluids      #  Hypercalcemia (12/3/2020). IVF. Monitor Ca level. Resolved      #  Hypokalemia (12/3/2020). Replete. trend mg level. Resolved    The rest of the patient's chronic conditions were managed appropriately during their admission. They were medically stable at the time of discharge. Visit Vitals  BP (!) 156/99 (BP 1 Location: Left arm, BP Patient Position: Supine; At rest)   Pulse 70   Temp 98.3 °F (36.8 °C)   Resp 18   Ht 6' 4\" (1.93 m)   Wt 78 kg (172 lb)   SpO2 100%   BMI 20.94 kg/m²       Physical Exam at Discharge:  General Appearance: NAD, conversant  HENT: normocephalic/atraumatic, moist mucus membranes  Lungs: CTA with normal respiratory effort  CV: RRR, no m/r/g  Abdomen: soft, non-tender, normal bowel sounds  Extremities: no cyanosis, no peripheral edema  Neuro: moves all extremities, no focal deficits  Psych: appropriate affect, alert and oriented to person, place and time    Labs Prior to Discharge:  Labs: Results:       Chemistry Recent Labs     12/08/20 0420 12/07/20 0430 12/06/20  0245   GLU 78 105* 105*   * 135* 134*   K 3.6 3.6 3.6    103 101   CO2 23 26 26   BUN 11 13 12   CREA 0.99 0.95 1.09   CA 9.4 9.0 9.1   AGAP 9 6 7   BUCR 11* 14 11*      CBC w/Diff Recent Labs     12/08/20 0420 12/07/20  0430 12/06/20  0245   WBC 10.2 11.4 13.3*   RBC 4.55* 4.09* 4.15*   HGB 14.5 13.1 13.2   HCT 44.2 38.9 40.1    156 146   GRANS 68 85* 92* LYMPH 16* 7* 4*   EOS 3 1 0      Cardiac Enzymes No results for input(s): CPK, CKND1, BRIGIDA in the last 72 hours. No lab exists for component: CKRMB, TROIP   Coagulation No results for input(s): PTP, INR, APTT, INREXT in the last 72 hours. Lipid Panel No results found for: CHOL, CHOLPOCT, CHOLX, CHLST, CHOLV, 507830, HDL, HDLP, LDL, LDLC, DLDLP, 627409, VLDLC, VLDL, TGLX, TRIGL, TRIGP, TGLPOCT, CHHD, CHHDX   BNP No results for input(s): BNPP in the last 72 hours. Liver Enzymes No results for input(s): TP, ALB, TBIL, AP in the last 72 hours. No lab exists for component: SGOT, GPT, DBIL   Thyroid Studies No results found for: T4, T3U, TSH, TSHEXT         Significant Imaging:  Ct Abd Pelv W Cont    Result Date: 12/7/2020  EXAM: CT ABD PELV W CONT CLINICAL INDICATION/HISTORY: Abdominal pain and vomiting, acute appendicitis, evaluate for intraperitoneal abscess COMPARISON: 12/3/2020 TECHNIQUE:   CT of the abdomen and pelvis following intravenous contrast administration. Coronal and sagittal reformats were generated and reviewed. One or more dose reduction techniques were used on this CT: automated exposure control, adjustment of the mAs and/or kVp according to patient size, and iterative reconstruction techniques. The specific techniques used on this CT exam have been documented in the patient's electronic medical record. Digital Imaging and Communications in Medicine (DICOM) format image data are available to nonaffiliated external healthcare facilities or entities on a secure, media free, reciprocally searchable basis with patient authorization for at least a 12-month period after this study. _______________ FINDINGS: LOWER THORAX: Tiny left and right pleural effusions, each with adjacent basilar atelectasis. No acute pulmonary infiltrate. No pleural effusion. No global cardiomegaly or pericardial effusion. LIVER AND BILIARY:  Hepatomegaly. No suspicious liver lesions. No biliary dilation. Gallbladder unremarkable. SPLEEN:  Normal. PANCREAS:  Normal. ADRENALS:  Normal. KIDNEYS:  Normal. LYMPH NODES:  No mesenteric or retroperitoneal lymphadenopathy. GI TRACT:  Small hiatal hernia. Normal caliber small and large bowel loops. No morphology of bowel obstruction. No bowel wall thickening. Appendix is surgically absent. PELVIC ORGANS:  Bladder is normal in appearance. No acute abnormality. VASCULATURE: Normal caliber lower thoracic and abdominal aorta. OTHER:   No ascites or free intraperitoneal air. OSSEOUS STRUCTURES:  No acute osseous abnormality. _______________     IMPRESSION: 1. Surgical changes of interval appendectomy. No pericecal abscess or other postoperative complication. 2. Small left and right pleural effusions, each with adjacent basilar atelectasis. Ct Abd Pelv W Cont    Result Date: 12/3/2020  EXAM: CT of the abdomen and pelvis INDICATION: Abdominal pain, emesis and chills COMPARISON: None. TECHNIQUE: Axial CT imaging of the abdomen and pelvis was performed with intravenous contrast. Multiplanar reformats were generated. One or more dose reduction techniques were used on this CT: automated exposure control, adjustment of the mAs and/or kVp according to patient size, and iterative reconstruction techniques. The specific techniques used on this CT exam have been documented in the patient's electronic medical record. Digital Imaging and Communications in Medicine (DICOM) format image data are available to nonaffiliated external healthcare facilities or entities on a secure, media free, reciprocally searchable basis with patient authorization for at least a 12-month period after this study. _______________ FINDINGS: LOWER CHEST: Unremarkable. LIVER, BILIARY: Liver is normal. No biliary dilation. Gallbladder is unremarkable. PANCREAS: Normal. SPLEEN: Normal. ADRENALS: Normal. KIDNEYS/URETERS/BLADDER: No hydronephrosis or nephrolithiasis. Urinary bladder normal in CT appearance.  PELVIC ORGANS: Unremarkable. VASCULATURE: Abdominal aorta is normal in course and caliber. LYMPH NODES: No enlarged lymph nodes. GASTROINTESTINAL TRACT: Predominantly fluid-filled and hyperemic appendix, measuring approximately 11 mm in greatest transaxial diameter. No radiopaque appendicoliths. Mild periappendiceal stranding noted. No evidence of gross free intraperitoneal perforation or organized/drainable periappendiceal fluid collection. Small hiatal hernia. Remaining small and large bowel appear to be of normal course and caliber. BONES: No acute or aggressive osseous abnormalities identified. OTHER: None. _______________     IMPRESSION: 1.  CT findings compatible with early acute unruptured appendicitis. No evidence of radiopaque appendicoliths. No evidence of an organized or drainable periappendiceal fluid collection. Discharge Medications:     Discharge Medication List as of 12/8/2020 10:27 AM      START taking these medications    Details   oxyCODONE-acetaminophen (PERCOCET) 5-325 mg per tablet Take 1 Tab by mouth every four (4) hours as needed for Pain for up to 3 days. Max Daily Amount: 6 Tabs., Normal, Disp-8 Tab,R-0      cephALEXin (Keflex) 500 mg capsule Take 1 Cap by mouth four (4) times daily for 4 days. , Normal, Disp-16 Cap,R-0             Activity: Activity as tolerated    Diet: Resume previous diet    Wound Care: None needed    Follow-up:   Please follow up with your PCP within 7 days to discuss your recent hospitalization. Patient to arrange.          Total time spent including time spent on final examination and discharge discussion, discharge documentation and records reviewed and medication reconciliation: > 30 minutes    Ashlee Cordon DO  Internal Medicine, Hospitalist  Pager: [phone]

## 2020-12-08 NOTE — PROGRESS NOTES
Problem: Discharge Planning  Goal: *Discharge to safe environment  Outcome:resolved/met    Pt dc'd home,no services ordered. Care Management Interventions  PCP Verified by CM: No  Palliative Care Criteria Met (RRAT>21 & CHF Dx)?: No  Mode of Transport at Discharge: Other (see comment)(Niece)  Transition of Care Consult (CM Consult): Discharge Planning  Current Support Network:  Other(Lives in [facility] and came to visit Mom for Thanksgiving)  Confirm Follow Up Transport: Family  The Plan for Transition of Care is Related to the Following Treatment Goals : meets post op goals  Discharge Location  Discharge Placement: Home(Will recover at his mom's house here in [place])

## 2020-12-09 ENCOUNTER — PATIENT OUTREACH (OUTPATIENT)
Dept: CASE MANAGEMENT | Age: 35
End: 2020-12-09

## 2020-12-09 LAB
BACTERIA SPEC CULT: NORMAL
SERVICE CMNT-IMP: NORMAL

## 2020-12-09 NOTE — PROGRESS NOTES
12/9/2020 10:57 AM     CTN attempted to contact patient for hospital follow up. Patient admitted to [facility] on 12/3/2020 and discharged on 12/8/2020 for appendicitis. Message left introducing myself, the purpose of the call and giving my contact information. Requested that patient call back at his earliest convenience.

## 2020-12-10 ENCOUNTER — PATIENT OUTREACH (OUTPATIENT)
Dept: CASE MANAGEMENT | Age: 35
End: 2020-12-10

## 2020-12-10 NOTE — PROGRESS NOTES
12/10/2020 3:16 PM     CTN attempted to contact patient for hospital follow up. Patient admitted to [facility] on 12/3/2020 and discharged on 12/8/2020 for appendicitis. Patient number had a full mailbox and a message was unable to be left. Left a  message introducing myself, the purpose of the call and giving my contact information on emergency contact phone. Requested that patient call back at his earliest convenience. This is second attempt to contact patient. Will follow.

## 2020-12-11 LAB
BACTERIA SPEC CULT: NORMAL
BACTERIA SPEC CULT: NORMAL
SERVICE CMNT-IMP: NORMAL
SERVICE CMNT-IMP: NORMAL

## 2020-12-14 NOTE — ANESTHESIA POSTPROCEDURE EVALUATION
Procedure(s):  APPENDECTOMY LAPAROSCOPIC.     general    Anesthesia Post Evaluation      Multimodal analgesia: multimodal analgesia used between 6 hours prior to anesthesia start to PACU discharge  Patient location during evaluation: PACU  Patient participation: complete - patient participated  Level of consciousness: awake and alert  Pain management: adequate  Airway patency: patent  Anesthetic complications: no  Cardiovascular status: acceptable  Respiratory status: acceptable  Hydration status: acceptable  Post anesthesia nausea and vomiting:  controlled  Final Post Anesthesia Temperature Assessment:  Normothermia (36.0-37.5 degrees C)      INITIAL Post-op Vital signs:   Vitals Value Taken Time   /94 12/3/2020 10:18 PM   Temp 37.1 °C (98.7 °F) 12/3/2020  9:02 PM   Pulse 100 12/3/2020 10:18 PM   Resp 13 12/3/2020 10:18 PM   SpO2 100 % 12/3/2020 10:18 PM

## 2020-12-15 ENCOUNTER — PATIENT OUTREACH (OUTPATIENT)
Dept: CASE MANAGEMENT | Age: 35
End: 2020-12-15

## 2020-12-15 NOTE — PROGRESS NOTES
12/15/2020 3:16 PM     CTN attempted to contact patient for hospital follow up. Patient admitted to [address] [place] [address] on 12/3/2020 and discharged on 12/8/2020 for appendicitis. Patient number had a full mailbox and a message was unable to be left. Left a  message introducing myself, the purpose of the call and giving my contact information on emergency contact phone. Requested that patient call back at his earliest convenience. This is the third attempt to contact patient. Will resolve.

## (undated) DEVICE — INTENDED FOR TISSUE SEPARATION, AND OTHER PROCEDURES THAT REQUIRE A SHARP SURGICAL BLADE TO PUNCTURE OR CUT.: Brand: BARD-PARKER ® CARBON RIB-BACK BLADES

## (undated) DEVICE — NEEDLE HYPO 25GA L1.5IN BVL ORIENTED ECLIPSE

## (undated) DEVICE — SYR 10ML CTRL LR LCK NSAF LF --

## (undated) DEVICE — RELOAD STPL M SZ 2 THK30MM PUR THCK TRI STPL

## (undated) DEVICE — UNIVERSAL STAPLER: Brand: ENDO GIA ULTRA

## (undated) DEVICE — STERILE POLYISOPRENE POWDER-FREE SURGICAL GLOVES: Brand: PROTEXIS

## (undated) DEVICE — SOLUTION IV 1000ML 0.9% SOD CHL

## (undated) DEVICE — DERMABOND SKIN ADH 0.7ML -- DERMABOND ADVANCED 12/BX

## (undated) DEVICE — TROCAR: Brand: KII SHIELDED BLADED ACCESS SYSTEM

## (undated) DEVICE — CORD ES L10FT MPLR LAP

## (undated) DEVICE — INSUFFLATION NEEDLE TO ESTABLISH PNEUMOPERITONEUM.: Brand: INSUFFLATION NEEDLE

## (undated) DEVICE — GARMENT,MEDLINE,DVT,INT,CALF,MED, GEN2: Brand: MEDLINE

## (undated) DEVICE — DEPAUL LAP CHOLE PACK: Brand: MEDLINE INDUSTRIES, INC.

## (undated) DEVICE — [HIGH FLOW INSUFFLATOR,  DO NOT USE IF PACKAGE IS DAMAGED,  KEEP DRY,  KEEP AWAY FROM SUNLIGHT,  PROTECT FROM HEAT AND RADIOACTIVE SOURCES.]: Brand: PNEUMOSURE

## (undated) DEVICE — (D)PREP SKN CHLRAPRP APPL 26ML -- CONVERT TO ITEM 371833

## (undated) DEVICE — SCISSORS ENDOSCP DIA5MM CRV MPLR CAUT W/ RATCH HNDL

## (undated) DEVICE — SUTURE MCRYL SZ 4-0 L27IN ABSRB UD L19MM PS-2 1/2 CIR PRIM Y426H